# Patient Record
Sex: FEMALE | Race: BLACK OR AFRICAN AMERICAN | Employment: FULL TIME | URBAN - METROPOLITAN AREA
[De-identification: names, ages, dates, MRNs, and addresses within clinical notes are randomized per-mention and may not be internally consistent; named-entity substitution may affect disease eponyms.]

---

## 2017-09-21 ENCOUNTER — HOSPITAL ENCOUNTER (OUTPATIENT)
Dept: MAMMOGRAPHY | Age: 42
Discharge: HOME | End: 2017-09-21
Attending: OBSTETRICS & GYNECOLOGY
Payer: OTHER GOVERNMENT

## 2017-09-21 DIAGNOSIS — Z12.31 VISIT FOR SCREENING MAMMOGRAM: ICD-10-CM

## 2017-09-21 PROCEDURE — 77067 SCR MAMMO BI INCL CAD: CPT

## 2018-01-25 ENCOUNTER — OFFICE VISIT (OUTPATIENT)
Dept: FAMILY MEDICINE CLINIC | Age: 43
End: 2018-01-25

## 2018-01-25 VITALS
RESPIRATION RATE: 16 BRPM | HEART RATE: 83 BPM | OXYGEN SATURATION: 99 % | SYSTOLIC BLOOD PRESSURE: 100 MMHG | BODY MASS INDEX: 29.66 KG/M2 | WEIGHT: 178 LBS | HEIGHT: 65 IN | TEMPERATURE: 98 F | DIASTOLIC BLOOD PRESSURE: 66 MMHG

## 2018-01-25 DIAGNOSIS — Z11.3 SCREEN FOR STD (SEXUALLY TRANSMITTED DISEASE): ICD-10-CM

## 2018-01-25 DIAGNOSIS — Z01.84 IMMUNITY STATUS TESTING: ICD-10-CM

## 2018-01-25 DIAGNOSIS — J06.9 UPPER RESPIRATORY TRACT INFECTION, UNSPECIFIED TYPE: ICD-10-CM

## 2018-01-25 DIAGNOSIS — Z01.419 WELL WOMAN EXAM WITH ROUTINE GYNECOLOGICAL EXAM: Primary | ICD-10-CM

## 2018-01-25 DIAGNOSIS — Z87.42 H/O INFERTILITY: ICD-10-CM

## 2018-01-25 NOTE — PATIENT INSTRUCTIONS

## 2018-01-25 NOTE — PROGRESS NOTES
1. Have you been to the ER, urgent care clinic since your last visit? Hospitalized since your last visit? No    2. Have you seen or consulted any other health care providers outside of the [address] [place] [address] since your last visit? Include any pap smears or colon screening.  No     Last Pap Smear:

## 2018-01-25 NOTE — MR AVS SNAPSHOT
[address] [place] [address] 
[phone] Patient: Taran Tyler MRN: [id number] :1975 Visit Information Date & Time Provider Department Dept. Phone Encounter #  
 2018  3:30 PM Misty Kasper, [address] [place] [address] [id number] Follow-up Instructions Return in about 1 year (around 2019) for physical.  Labs due at your earliest convenience . Upcoming Health Maintenance Date Due  
 PAP AKA CERVICAL CYTOLOGY 2016 Influenza Age 5 to Adult 2017 BREAST CANCER SCRN MAMMOGRAM 2019 DTaP/Tdap/Td series (6 - Td) 10/27/2024 Allergies as of 2018  Review Complete On: 2018 By: Bethany Chau Severity Noted Reaction Type Reactions Milk  10/24/2014    Diarrhea Current Immunizations  Reviewed on 2018 Name Date DTaP 1979, 1975, 3/6/1975, 1975 Influenza Vaccine (Quad) PF 2016 MMR 1979, 1977 Poliovirus vaccine 1979, 1975, 3/6/1975, 1975 TB Skin Test (PPD) 1979, 1977 TB Skin Test (PPD) Intradermal 2016 Tdap 10/27/2014 11:12 AM  
  
 Reviewed by Bethany Chau on 2018 at  3:45 PM  
You Were Diagnosed With   
  
 Codes Comments H/O infertility    -  Primary ICD-10-CM: Z87.42 
ICD-9-CM: V13.29 Screen for STD (sexually transmitted disease)     ICD-10-CM: Z11.3 ICD-9-CM: V74.5 Well woman exam with routine gynecological exam     ICD-10-CM: L82.949 ICD-9-CM: V72.31 Immunity status testing     ICD-10-CM: Z01.84 ICD-9-CM: V72.61 Vitals BP Pulse Temp Resp Height(growth percentile) Weight(growth percentile) 100/66 (BP 1 Location: Right arm, BP Patient Position: Sitting) 83 98 °F (36.7 °C) (Oral) 16 5' 5\" (1.651 m) 178 lb (80.7 kg) SpO2 BMI OB Status Smoking Status 99% 29.62 kg/m2 Having regular periods Never Smoker Vitals History BMI and BSA Data Body Mass Index Body Surface Area  
 29.62 kg/m 2 1.92 m 2 Preferred Pharmacy Pharmacy Name Phone [facility], [address] [phone] Your Updated Medication List  
  
   
This list is accurate as of: 1/25/18  4:37 PM.  Always use your most recent med list.  
  
  
  
  
 VALTREX 500 mg tablet Generic drug:  valACYclovir Take 500 mg by mouth two (2) times a day. Indications: PREVENTION OF HERPES ZOSTER IN IMMUNOCOMPROMISED PATIENT We Performed the Following REFERRAL TO OBSTETRICS AND GYNECOLOGY [REF51 Custom] Follow-up Instructions Return in about 1 year (around 1/25/2019) for physical.  Labs due at your earliest convenience . To-Do List   
 01/25/2018 Lab:  CHLAMYDIA/GC PCR   
  
 01/25/2018 Lab:  CMV AB, IGG   
  
 01/25/2018 Lab:  CMV AB, IGM   
  
 01/25/2018 Lab:  CYSTIC FIBROSIS MUTATIONS   
  
 01/25/2018 Lab:  HEMOGLOBIN FRACTIONATION   
  
 01/25/2018 Lab:  HEP B SURFACE AG   
  
 01/25/2018 Lab:  HEPATITIS C AB   
  
 01/25/2018 Lab:  HIV 1/2 AG/AB, 4TH GENERATION,W RFLX CONFIRM   
  
 01/25/2018 Lab:  HTLV I-II   
  
 01/25/2018 Lab:  RPR   
  
 01/25/2018 Lab:  RUBELLA AB, IGG   
  
 01/25/2018 Lab:  TSH 3RD GENERATION   
  
 01/25/2018 Blood Bank:  TYPE & SCREEN   
  
 01/25/2018 Lab:  VZV AB, IGG Referral Information Referral ID Referred By Referred To  
  
 [id number] Yon [address] [facility], [place]   
   [address] [place] [address], [address] [place] [address],[facility] 300 Phone: [phone] Fax: [phone] Visits Status Start Date End Date 1 New Request 1/25/18 1/25/19 If your referral has a status of pending review or denied, additional information will be sent to support the outcome of this decision. Patient Instructions Well Visit, Ages 25 to 48: Care Instructions Your Care Instructions Physical exams can help you stay healthy. Your doctor has checked your overall health and may have suggested ways to take good care of yourself. He or she also may have recommended tests. At home, you can help prevent illness with healthy eating, regular exercise, and other steps. Follow-up care is a key part of your treatment and safety. Be sure to make and go to all appointments, and call your doctor if you are having problems. It's also a good idea to know your test results and keep a list of the medicines you take. How can you care for yourself at home? · Reach and stay at a healthy weight. This will lower your risk for many problems, such as obesity, diabetes, heart disease, and high blood pressure. · Get at least 30 minutes of physical activity on most days of the week. Walking is a good choice. You also may want to do other activities, such as running, swimming, cycling, or playing tennis or team sports. Discuss any changes in your exercise program with your doctor. · Do not smoke or allow others to smoke around you. If you need help quitting, talk to your doctor about stop-smoking programs and medicines. These can increase your chances of quitting for good. · Talk to your doctor about whether you have any risk factors for sexually transmitted infections (STIs). Having one sex partner (who does not have STIs and does not have sex with anyone else) is a good way to avoid these infections. · Use birth control if you do not want to have children at this time. Talk with your doctor about the choices available and what might be best for you. · Protect your skin from too much sun. When you're outdoors from 10 a.m. to 4 p.m., stay in the shade or cover up with clothing and a hat with a wide brim. Wear sunglasses that block UV rays. Even when it's cloudy, put broad-spectrum sunscreen (SPF 30 or higher) on any exposed skin. · See a dentist one or two times a year for checkups and to have your teeth cleaned. · Wear a seat belt in the car. · Drink alcohol in moderation, if at all. That means no more than 2 drinks a day for men and 1 drink a day for women. Follow your doctor's advice about when to have certain tests. These tests can spot problems early. For everyone · Cholesterol. Have the fat (cholesterol) in your blood tested after age 21. Your doctor will tell you how often to have this done based on your age, family history, or other things that can increase your risk for heart disease. · Blood pressure. Have your blood pressure checked during a routine doctor visit. Your doctor will tell you how often to check your blood pressure based on your age, your blood pressure results, and other factors. · Vision. Talk with your doctor about how often to have a glaucoma test. 
· Diabetes. Ask your doctor whether you should have tests for diabetes. · Colon cancer. Have a test for colon cancer at age 48. You may have one of several tests. If you are younger than 48, you may need a test earlier if you have any risk factors. Risk factors include whether you already had a precancerous polyp removed from your colon or whether your parent, brother, sister, or child has had colon cancer. For women · Breast exam and mammogram. Talk to your doctor about when you should have a clinical breast exam and a mammogram. Medical experts differ on whether and how often women under 50 should have these tests. Your doctor can help you decide what is right for you. · Pap test and pelvic exam. Begin Pap tests at age 24. A Pap test is the best way to find cervical cancer. The test often is part of a pelvic exam. Ask how often to have this test. 
· Tests for sexually transmitted infections (STIs). Ask whether you should have tests for STIs. You may be at risk if you have sex with more than one person, especially if your partners do not wear condoms. For men · Tests for sexually transmitted infections (STIs). Ask whether you should have tests for STIs. You may be at risk if you have sex with more than one person, especially if you do not wear a condom. · Testicular cancer exam. Ask your doctor whether you should check your testicles regularly. · Prostate exam. Talk to your doctor about whether you should have a blood test (called a PSA test) for prostate cancer. Experts differ on whether and when men should have this test. Some experts suggest it if you are older than 39 and are -American or have a father or brother who got prostate cancer when he was younger than 72. When should you call for help? Watch closely for changes in your health, and be sure to contact your doctor if you have any problems or symptoms that concern you. Where can you learn more? Go to http://radha-roney.info/. Enter P072 in the search box to learn more about \"Well Visit, Ages 25 to 48: Care Instructions. \" Current as of: May 12, 2017 Content Version: 11.4 © 6527-0258 [employer]. Care instructions adapted under license by [employer] (which disclaims liability or warranty for this information). If you have questions about a medical condition or this instruction, always ask your healthcare professional. [address] any warranty or liability for your use of this information. Introducing [facility] & HEALTH SERVICES! Dear Ladonna Stallings: Thank you for requesting a [employer] account. Our records indicate that you already have an active [employer] account. You can access your account anytime at [email or web address] Did you know that you can access your hospital and ER discharge instructions at any time in [employer]? You can also review all of your test results from your hospital stay or ER visit. Additional Information If you have questions, please visit the Frequently Asked Questions section of the [employer] website at [email or web address] Remember, [employer] is NOT to be used for urgent needs. For medical emergencies, dial 911. Now available from your iPhone and Android! Please provide this summary of care documentation to your next provider. Your primary care clinician is listed as Kenisha Wiley. If you have any questions after today's visit, please call [phone].

## 2018-01-26 NOTE — PROGRESS NOTES
Subjective:   36 y.o. female for Well Woman Check. Her gyne and breast care is done elsewhere by her Ob-Gyn physician. Current Outpatient Prescriptions   Medication Sig Dispense Refill    valacyclovir (VALTREX) 500 mg tablet Take 500 mg by mouth two (2) times a day. Indications: PREVENTION OF HERPES ZOSTER IN IMMUNOCOMPROMISED PATIENT       Allergies   Allergen Reactions    Milk Diarrhea     Past Medical History:   Diagnosis Date    Allergic rhinitis     Hallux valgus     Patellofemoral syndrome     Vitamin D deficiency      Past Surgical History:   Procedure Laterality Date    HX OTHER SURGICAL  3/2013    hysteroscopy - polyp removal     Family History   Problem Relation Age of Onset    Asthma Mother     Hypertension Mother     Cancer Father      prostate    Ovarian Cancer Maternal Grandmother     Dementia Paternal Grandmother     Elevated Lipids Brother      Social History   Substance Use Topics    Smoking status: Never Smoker    Smokeless tobacco: Never Used    Alcohol use No      ROS: Feeling generally well. No TIA's or unusual headaches, no dysphagia. No prolonged cough. No dyspnea or chest pain on exertion. No abdominal pain, change in bowel habits, black or bloody stools. No urinary tract symptoms. No new or unusual musculoskeletal symptoms. Specific concerns today:   She had a cold that she feels she is getting over. No complaints. She wanted to address her infertility work-up instead. 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 Objective: The patient appears well, alert, oriented x 3, in no distress. Visit Vitals    /66 (BP 1 Location: Right arm, BP Patient Position: Sitting)    Pulse 83    Temp 98 °F (36.7 °C) (Oral)    Resp 16    Ht 5' 5\" (1.651 m)    Wt 178 lb (80.7 kg)    SpO2 99%    BMI 29.62 kg/m2     ENT normal.  Neck supple. No adenopathy or thyromegaly. GEOVANNY.  Lungs are clear, good air entry, no wheezes, rhonchi or rales. S1 and S2 normal, no murmurs, regular rate and rhythm. Abdomen soft without tenderness, guarding, mass or organomegaly. Extremities show no edema, normal peripheral pulses. Neurological is normal, no focal findings. Psych: normal affect. Mood good. Oriented x 3. Judgement and insight intact. Breast and Pelvic exams are deferred. Assessment/Plan:       ICD-10-CM ICD-9-CM    1. Well woman exam with routine gynecological exam Z01.419 V72.31 REFERRAL TO OBSTETRICS AND GYNECOLOGY   2. H/O infertility Z87.42 V13.29 TSH 3RD GENERATION      TYPE & SCREEN      CYSTIC FIBROSIS MUTATIONS      HEMOGLOBIN FRACTIONATION   3. Screen for STD (sexually transmitted disease) Z11.3 V74.5 HEP B SURFACE AG      HEPATITIS C AB      HIV 1/2 AG/AB, 4TH GENERATION,W RFLX CONFIRM      RPR      CHLAMYDIA/GC PCR   4. Immunity status testing Z01.84 V72.61 HTLV I-II      CMV AB, IGG      CMV AB, IGM      RUBELLA AB, IGG      VZV AB, IGG   5. Body mass index (BMI) of 25.0 to 29.9 E66.3 278.02    6. Upper respiratory tract infection, unspecified type J06.9 465.9      Age and sex specific counseling. Lab panel ordered per her request.  I have advised that some of these labs were done before but she says she would rather have the entire panel done again because the infertility clinic is particular. Diet and exercise. Monitor UTI to resolution. Refer to OB/GYN for well woman exam as well. All chart history elements were reviewed by me at the time of the visit even though marked at time of note closure. Patient understands our medical plan. Patient has provided input and agrees with goals. Alternatives have been explained and offered. All questions answered. The patient is to call if condition worsens or fails to improve. Follow-up Disposition:  Return in about 1 year (around 1/25/2019) for physical.  Labs due at your earliest convenience .

## 2018-02-05 ENCOUNTER — HOSPITAL ENCOUNTER (OUTPATIENT)
Dept: LAB | Age: 43
Discharge: HOME | End: 2018-02-05
Payer: OTHER GOVERNMENT

## 2018-02-05 DIAGNOSIS — Z01.84 IMMUNITY STATUS TESTING: ICD-10-CM

## 2018-02-05 DIAGNOSIS — Z87.42 H/O INFERTILITY: ICD-10-CM

## 2018-02-05 DIAGNOSIS — Z11.3 SCREEN FOR STD (SEXUALLY TRANSMITTED DISEASE): ICD-10-CM

## 2018-02-05 LAB
HBV SURFACE AG SER QL: <0.1 INDEX
HBV SURFACE AG SER QL: NEGATIVE
HCV AB SER IA-ACNC: 0.04 INDEX
HCV AB SERPL QL IA: NEGATIVE
HCV COMMENT,HCGAC: NORMAL
HIV 1+2 AB+HIV1 P24 AG SERPL QL IA: NONREACTIVE
HIV12 RESULT COMMENT, HHIVC: NORMAL
RPR SER QL: NONREACTIVE
TSH SERPL DL<=0.05 MIU/L-ACNC: 0.73 UIU/ML (ref 0.36–3.74)

## 2018-02-05 PROCEDURE — 86687 HTLV-I ANTIBODY: CPT | Performed by: FAMILY MEDICINE

## 2018-02-05 PROCEDURE — 83021 HEMOGLOBIN CHROMOTOGRAPHY: CPT | Performed by: FAMILY MEDICINE

## 2018-02-05 PROCEDURE — 86592 SYPHILIS TEST NON-TREP QUAL: CPT | Performed by: FAMILY MEDICINE

## 2018-02-05 PROCEDURE — 73060999999 HC MISC LAB CHARGE

## 2018-02-05 PROCEDURE — 86645 CMV ANTIBODY IGM: CPT | Performed by: FAMILY MEDICINE

## 2018-02-05 PROCEDURE — 87340 HEPATITIS B SURFACE AG IA: CPT | Performed by: FAMILY MEDICINE

## 2018-02-05 PROCEDURE — 87389 HIV-1 AG W/HIV-1&-2 AB AG IA: CPT | Performed by: FAMILY MEDICINE

## 2018-02-05 PROCEDURE — 86787 VARICELLA-ZOSTER ANTIBODY: CPT | Performed by: FAMILY MEDICINE

## 2018-02-05 PROCEDURE — 86644 CMV ANTIBODY: CPT | Performed by: FAMILY MEDICINE

## 2018-02-05 PROCEDURE — 84443 ASSAY THYROID STIM HORMONE: CPT | Performed by: FAMILY MEDICINE

## 2018-02-05 PROCEDURE — 36415 COLL VENOUS BLD VENIPUNCTURE: CPT | Performed by: FAMILY MEDICINE

## 2018-02-05 PROCEDURE — 86803 HEPATITIS C AB TEST: CPT | Performed by: FAMILY MEDICINE

## 2018-02-05 PROCEDURE — 81401 MOPATH PROCEDURE LEVEL 2: CPT

## 2018-02-05 PROCEDURE — 81220 CFTR GENE COM VARIANTS: CPT | Performed by: FAMILY MEDICINE

## 2018-02-05 PROCEDURE — 87491 CHLMYD TRACH DNA AMP PROBE: CPT | Performed by: FAMILY MEDICINE

## 2018-02-05 PROCEDURE — 86762 RUBELLA ANTIBODY: CPT | Performed by: FAMILY MEDICINE

## 2018-02-06 LAB
CMV IGG SERPL IA-ACNC: <0.6 U/ML (ref 0–0.59)
CMV IGM SERPL IA-ACNC: <30 AU/ML (ref 0–29.9)
DEPRECATED HGB OTHER BLD-IMP: 0 %
HGB A MFR BLD: 98 % (ref 96.4–98.8)
HGB A2 MFR BLD COLUMN CHROM: 2 % (ref 1.8–3.2)
HGB C MFR BLD: 0 %
HGB F MFR BLD: 0 % (ref 0–2)
HGB FRACT BLD-IMP: NORMAL
HGB S BLD QL SOLY: NEGATIVE
HGB S MFR BLD: 0 %
RUBV IGG SER-IMP: NORMAL
VZV IGG SER IA-ACNC: 1747 INDEX

## 2018-02-07 LAB
C TRACH RRNA SPEC QL NAA+PROBE: NEGATIVE
N GONORRHOEA RRNA SPEC QL NAA+PROBE: NEGATIVE
SPECIMEN SOURCE: NORMAL

## 2018-02-08 LAB
HTLV I AB SER QL IB: NEGATIVE
HTLV II AB SER QL: NEGATIVE

## 2018-02-09 LAB
CFTR MUT ANL BLD/T: NORMAL
COMMENT: 480556: NORMAL

## 2018-02-16 LAB
Lab: NORMAL
REFERENCE LAB,REFLB: NORMAL
TEST DESCRIPTION:,ATST: NORMAL

## 2018-05-24 ENCOUNTER — HOSPITAL ENCOUNTER (OUTPATIENT)
Dept: GENERAL RADIOLOGY | Age: 43
Discharge: HOME | End: 2018-05-24
Payer: OTHER GOVERNMENT

## 2018-05-24 ENCOUNTER — OFFICE VISIT (OUTPATIENT)
Dept: FAMILY MEDICINE CLINIC | Age: 43
End: 2018-05-24

## 2018-05-24 VITALS
SYSTOLIC BLOOD PRESSURE: 102 MMHG | RESPIRATION RATE: 14 BRPM | HEIGHT: 65 IN | OXYGEN SATURATION: 97 % | TEMPERATURE: 97.7 F | WEIGHT: 182 LBS | BODY MASS INDEX: 30.32 KG/M2 | DIASTOLIC BLOOD PRESSURE: 68 MMHG | HEART RATE: 77 BPM

## 2018-05-24 DIAGNOSIS — M25.561 ACUTE PAIN OF RIGHT KNEE: Primary | ICD-10-CM

## 2018-05-24 DIAGNOSIS — M25.561 ACUTE PAIN OF RIGHT KNEE: ICD-10-CM

## 2018-05-24 PROCEDURE — 73564 X-RAY EXAM KNEE 4 OR MORE: CPT

## 2018-05-24 RX ORDER — ESTRADIOL 1 MG/1
2 TABLET ORAL 3 TIMES DAILY
COMMUNITY
End: 2018-09-12

## 2018-05-24 NOTE — PATIENT INSTRUCTIONS
Patellofemoral Pain Syndrome: Care Instructions  Your Care Instructions  Patellofemoral pain syndrome is pain in the front of the knee. It is caused by overuse, weak thigh muscles (quadriceps), or a problem with the way the kneecap moves. Extra weight may also cause this syndrome. The patella is the kneecap, and the femur is the thighbone. Some people may have pain in the front of the knee from a condition called chondromalacia. In this problem, the underside of the knee cartilage wears down and frays. Cartilage is a rubbery tissue that cushions joints. In some cases, the kneecap does not move, or track, in a normal way. You may have knee pain when you run, walk down hills or steps, or do another activity. Sitting for a long time also can cause knee pain. Your knee pain may get better with medicines for pain and swelling and exercises to make your quadriceps stronger. Losing weight, if you need to, may also help with pain. Follow-up care is a key part of your treatment and safety. Be sure to make and go to all appointments, and call your doctor if you are having problems. It's also a good idea to know your test results and keep a list of the medicines you take. How can you care for yourself at home? · Ask your doctor if you can take an over-the-counter pain medicine, such as acetaminophen (Tylenol), ibuprofen (Advil, Motrin), or naproxen (Aleve). Be safe with medicines. Read and follow all instructions on the label. · Rest and protect your knee. Take a break from activities that cause pain, such as long periods of sitting or kneeling. · Put ice or a cold pack on your knee for 10 to 20 minutes after activity. Put a thin cloth between the ice and your skin. · If your doctor recommends an elastic bandage, sleeve, or other type of support for your knee, wear it as directed. · If your knee is not swollen, you can put moist heat, a heating pad, or a warm cloth on your knee.  After several days of rest, you can begin gentle exercise of your knee. · Reach and stay at a healthy weight. Being overweight puts stress on your knees. · Wear athletic shoes that offer good support, especially if you run. · Use shoe inserts, or orthotics, if they help reduce your knee pain. Many drugstores and shoe stores sell them. · See a physical therapist to learn more exercises and stretches to make your legs stronger. When should you call for help? Watch closely for changes in your health, and be sure to contact your doctor if:  ? · Your knee pain does not get better or gets worse. Where can you learn more? Go to http://radha-roney.info/. Enter F136 in the search box to learn more about \"Patellofemoral Pain Syndrome: Care Instructions. \"  Current as of: March 21, 2017  Content Version: 11.4  © 4588-1366 Healthwise, Incorporated. Care instructions adapted under license by [employer] (which disclaims liability or warranty for this information). If you have questions about a medical condition or this instruction, always ask your healthcare professional. [address] any warranty or liability for your use of this information.

## 2018-05-24 NOTE — PROGRESS NOTES
Chief Complaint   Patient presents with    Knee Pain     right x 1 week; denies injury; worsening of pain; taking Motrin PRN; + swelling     . 1. Have you been to the ER, urgent care clinic since your last visit? Hospitalized since your last visit? No    2. Have you seen or consulted any other health care providers outside of the [facility] since your last visit? Include any pap smears or colon screening.  Yes Where: IVF treatment

## 2018-05-24 NOTE — MR AVS SNAPSHOT
[address] [facility] [address] 
[phone] Patient: León Olivo MRN: [id number] :1975 Visit Information Date & Time Provider Department Dept. Phone Encounter #  
 2018  4:00 PM Hugo Tapia, [address] [id number] Follow-up Instructions Return as needed. Your Appointments 2019  8:00 AM  
FOLLOW UP EXAM with Hugo Tapia MD  
[facility] ([address]) Appt Note: cpe  
 [address] [facility] [address]  
[address] [facility] [address] [facility] [address] Upcoming Health Maintenance Date Due Influenza Age 5 to Adult 2018 BREAST CANCER SCRN MAMMOGRAM 2019 PAP AKA CERVICAL CYTOLOGY 2020 DTaP/Tdap/Td series (6 - Td) 10/27/2024 Allergies as of 2018  Review Complete On: 2018 By: Hugo Tapia MD  
  
 Severity Noted Reaction Type Reactions Milk  10/24/2014    Diarrhea Current Immunizations  Reviewed on 2018 Name Date DTaP 1979, 1975, 3/6/1975, 1975 Influenza Vaccine (Quad) PF 2016 MMR 1979, 1977 Poliovirus vaccine 1979, 1975, 3/6/1975, 1975 TB Skin Test (PPD) 1979, 1977 TB Skin Test (PPD) Intradermal 2016 Tdap 10/27/2014 11:12 AM  
  
 Not reviewed this visit You Were Diagnosed With   
  
 Codes Comments Acute pain of right knee    -  Primary ICD-10-CM: M25.561 ICD-9-CM: 719.46 Vitals BP Pulse Temp Resp Height(growth percentile) Weight(growth percentile) 102/68 (BP 1 Location: Left arm, BP Patient Position: Sitting) 77 97.7 °F (36.5 °C) (Oral) 14 5' 5\" (1.651 m) 182 lb (82.6 kg) LMP SpO2 BMI OB Status Smoking Status 2018 97% 30.29 kg/m2 Having regular periods Never Smoker Vitals History BMI and BSA Data Body Mass Index Body Surface Area  
 30.29 kg/m 2 1.95 m 2 Preferred Pharmacy Pharmacy Name Phone JAX[facility] RYAN [facility] [address], [address] [phone] Your Updated Medication List  
  
   
This list is accurate as of 5/24/18  4:36 PM.  Always use your most recent med list.  
  
  
  
  
 ESTRACE 1 mg tablet Generic drug:  estradiol Take 2 mg by mouth three (3) times daily. LUPRON SC  
20 mL by SubCUTAneous route daily. VALTREX 500 mg tablet Generic drug:  valACYclovir Take 500 mg by mouth two (2) times a day. Indications: PREVENTION OF HERPES ZOSTER IN IMMUNOCOMPROMISED PATIENT Follow-up Instructions Return as needed. To-Do List   
 05/24/2018 Imaging:  XR KNEE RT 3 V Patient Instructions Patellofemoral Pain Syndrome: Care Instructions Your Care Instructions Patellofemoral pain syndrome is pain in the front of the knee. It is caused by overuse, weak thigh muscles (quadriceps), or a problem with the way the kneecap moves. Extra weight may also cause this syndrome. The patella is the kneecap, and the femur is the thighbone. Some people may have pain in the front of the knee from a condition called chondromalacia. In this problem, the underside of the knee cartilage wears down and frays. Cartilage is a rubbery tissue that cushions joints. In some cases, the kneecap does not move, or track, in a normal way. You may have knee pain when you run, walk down hills or steps, or do another activity. Sitting for a long time also can cause knee pain. Your knee pain may get better with medicines for pain and swelling and exercises to make your quadriceps stronger. Losing weight, if you need to, may also help with pain. Follow-up care is a key part of your treatment and safety.  Be sure to make and go to all appointments, and call your doctor if you are having problems. It's also a good idea to know your test results and keep a list of the medicines you take. How can you care for yourself at home? · Ask your doctor if you can take an over-the-counter pain medicine, such as acetaminophen (Tylenol), ibuprofen (Advil, Motrin), or naproxen (Aleve). Be safe with medicines. Read and follow all instructions on the label. · Rest and protect your knee. Take a break from activities that cause pain, such as long periods of sitting or kneeling. · Put ice or a cold pack on your knee for 10 to 20 minutes after activity. Put a thin cloth between the ice and your skin. · If your doctor recommends an elastic bandage, sleeve, or other type of support for your knee, wear it as directed. · If your knee is not swollen, you can put moist heat, a heating pad, or a warm cloth on your knee. After several days of rest, you can begin gentle exercise of your knee. · Reach and stay at a healthy weight. Being overweight puts stress on your knees. · Wear athletic shoes that offer good support, especially if you run. · Use shoe inserts, or orthotics, if they help reduce your knee pain. Many drugstores and shoe stores sell them. · See a physical therapist to learn more exercises and stretches to make your legs stronger. When should you call for help? Watch closely for changes in your health, and be sure to contact your doctor if: 
? · Your knee pain does not get better or gets worse. Where can you learn more? Go to http://radha-roney.info/. Enter F280 in the search box to learn more about \"Patellofemoral Pain Syndrome: Care Instructions. \" Current as of: March 21, 2017 Content Version: 11.4 © 6755-0741 [employer]. Care instructions adapted under license by [employer] (which disclaims liability or warranty for this information).  If you have questions about a medical condition or this instruction, always ask your healthcare professional. [address] any warranty or liability for your use of this information. Introducing [facility] & HEALTH SERVICES! Dear Arleth Warren: Thank you for requesting a [employer] account. Our records indicate that you already have an active [employer] account. You can access your account anytime at [email or web address] Did you know that you can access your hospital and ER discharge instructions at any time in [employer]? You can also review all of your test results from your hospital stay or ER visit. Additional Information If you have questions, please visit the Frequently Asked Questions section of the [employer] website at [email or web address] Remember, [employer] is NOT to be used for urgent needs. For medical emergencies, dial 911. Now available from your iPhone and Android! Please provide this summary of care documentation to your next provider. Your primary care clinician is listed as Marea Art. If you have any questions after today's visit, please call [phone].

## 2018-05-25 NOTE — PROGRESS NOTES
SUBJECTIVE  Chief Complaint   Patient presents with    Knee Pain     right x 1 week; denies injury; worsening of pain; taking Motrin PRN; + swelling     The patient presents complaining of a 1 week history of right knee pain. Location: diffuse  Quality: sore  Injury: denies but had been doing more exercise  - long walking, strength training  Radiation: denies  Swelling: feels swollen  Instability: denies   Locking: denies   Aggravating factors: walking  Relieving factors: Motrin   Has tried: Motrin  X-rays: not yet  PT: not yet    OBJECTIVE    Blood pressure 102/68, pulse 77, temperature 97.7 °F (36.5 °C), temperature source Oral, resp. rate 14, height 5' 5\" (1.651 m), weight 182 lb (82.6 kg), last menstrual period 05/14/2018, SpO2 97 %. General:  alert, cooperative, well appearing, in no apparent distress. Right Knee: Inspection of the knee demonstrates there is no obvious deformity. There is a 1_ effusion. There is no evidence of dislocation. There is normal flexion and extension of the bilateral knee. Full flexion caused her to jump initially in pain. Assessment of ligamentous stability demonstrates the ACL/PCL/LCL/MCL are all intact. There is no pain with varus or valgus strain. Palpation demonstrates there is no medial or lateral joint line tenderness. There is no tenderness of the pes anserine bursa. There is no tenderness along the tibial tuberosity. The patellar tendon is slightly sensitive. There is no tenderness under the medial facet of the patella. There is mild apprehension with patellar subluxation. There is no pain with rocking or grinding. The bilateral knee demonstrates normal muscle tone with 5/5 strength with flexion extension. Skin:  There is no redness or warmth. No local rashes. There is no bruising. Psych: normal affect. Mood good. Oriented x 3. Judgement and insight intact. ASSESSMENT / PLAN    ICD-10-CM ICD-9-CM    1.  Acute pain of right knee M25.561 719.46 CANCELED: XR KNEE RT 3 V     X-rays ordered. Ice twice daily. NSAIDs as long as okay with fertility treatments. Advised on activity modification. HEP provided for PFS. All chart history elements were reviewed by me at the time of the visit even though marked at time of note closure. Patient understands our medical plan. Patient has provided input and agrees with goals. Alternatives have been explained and offered. All questions answered. The patient is to call if condition worsens or fails to improve. Follow-up Disposition:  Return as needed.

## 2018-06-18 ENCOUNTER — OFFICE VISIT (OUTPATIENT)
Dept: ORTHOPEDIC SURGERY | Facility: CLINIC | Age: 43
End: 2018-06-18

## 2018-06-18 VITALS
RESPIRATION RATE: 18 BRPM | WEIGHT: 182.4 LBS | BODY MASS INDEX: 30.39 KG/M2 | SYSTOLIC BLOOD PRESSURE: 117 MMHG | OXYGEN SATURATION: 99 % | TEMPERATURE: 98.8 F | HEART RATE: 85 BPM | HEIGHT: 65 IN | DIASTOLIC BLOOD PRESSURE: 65 MMHG

## 2018-06-18 DIAGNOSIS — M65.9 SYNOVITIS OF KNEE: ICD-10-CM

## 2018-06-18 DIAGNOSIS — M71.21 POPLITEAL CYST, RIGHT: ICD-10-CM

## 2018-06-18 DIAGNOSIS — M17.11 PRIMARY OSTEOARTHRITIS OF RIGHT KNEE: Primary | ICD-10-CM

## 2018-06-18 DIAGNOSIS — M25.461 KNEE EFFUSION, RIGHT: ICD-10-CM

## 2018-06-18 RX ORDER — BUPIVACAINE HYDROCHLORIDE 2.5 MG/ML
4 INJECTION, SOLUTION EPIDURAL; INFILTRATION; INTRACAUDAL ONCE
Qty: 4 ML | Refills: 0
Start: 2018-06-18 | End: 2018-06-18

## 2018-06-18 RX ORDER — BUPIVACAINE HYDROCHLORIDE 2.5 MG/ML
10 INJECTION, SOLUTION EPIDURAL; INFILTRATION; INTRACAUDAL ONCE
Qty: 10 ML | Refills: 0
Start: 2018-06-18 | End: 2018-06-18

## 2018-06-18 RX ORDER — NORGESTREL AND ETHINYL ESTRADIOL 0.3-0.03MG
KIT ORAL
COMMUNITY
Start: 2018-05-06 | End: 2018-10-17 | Stop reason: SDUPTHER

## 2018-06-18 RX ORDER — BETAMETHASONE SODIUM PHOSPHATE AND BETAMETHASONE ACETATE 3; 3 MG/ML; MG/ML
6 INJECTION, SUSPENSION INTRA-ARTICULAR; INTRALESIONAL; INTRAMUSCULAR; SOFT TISSUE ONCE
Qty: 0.5 ML | Refills: 0
Start: 2018-06-18 | End: 2018-06-18

## 2018-06-18 NOTE — PATIENT INSTRUCTIONS
Knee Arthritis: Exercises  Your Care Instructions  Here are some examples of exercises for knee arthritis. Start each exercise slowly. Ease off the exercise if you start to have pain. Your doctor or physical therapist will tell you when you can start these exercises and which ones will work best for you. How to do the exercises  Knee flexion with heel slide    1. Lie on your back with your knees bent. 2. Slide your heel back by bending your affected knee as far as you can. Then hook your other foot around your ankle to help pull your heel even farther back. 3. Hold for about 6 seconds, then rest for up to 10 seconds. 4. Repeat 8 to 12 times. 5. Switch legs and repeat steps 1 through 4, even if only one knee is sore. Quad sets    1. Sit with your affected leg straight and supported on the floor or a firm bed. Place a small, rolled-up towel under your knee. Your other leg should be bent, with that foot flat on the floor. 2. Tighten the thigh muscles of your affected leg by pressing the back of your knee down into the towel. 3. Hold for about 6 seconds, then rest for up to 10 seconds. 4. Repeat 8 to 12 times. 5. Switch legs and repeat steps 1 through 4, even if only one knee is sore. Straight-leg raises to the front    1. Lie on your back with your good knee bent so that your foot rests flat on the floor. Your affected leg should be straight. Make sure that your low back has a normal curve. You should be able to slip your hand in between the floor and the small of your back, with your palm touching the floor and your back touching the back of your hand. 2. Tighten the thigh muscles in your affected leg by pressing the back of your knee flat down to the floor. Hold your knee straight. 3. Keeping the thigh muscles tight and your leg straight, lift your affected leg up so that your heel is about 12 inches off the floor. Hold for about 6 seconds, then lower slowly.   4. Relax for up to 10 seconds between repetitions. 5. Repeat 8 to 12 times. 6. Switch legs and repeat steps 1 through 5, even if only one knee is sore. Active knee flexion    1. Lie on your stomach with your knees straight. If your kneecap is uncomfortable, roll up a washcloth and put it under your leg just above your kneecap. 2. Lift the foot of your affected leg by bending the knee so that you bring the foot up toward your buttock. If this motion hurts, try it without bending your knee quite as far. This may help you avoid any painful motion. 3. Slowly move your leg up and down. 4. Repeat 8 to 12 times. 5. Switch legs and repeat steps 1 through 4, even if only one knee is sore. Quadriceps stretch (facedown)    1. Lie flat on your stomach, and rest your face on the floor. 2. Wrap a towel or belt strap around the lower part of your affected leg. Then use the towel or belt strap to slowly pull your heel toward your buttock until you feel a stretch. 3. Hold for about 15 to 30 seconds, then relax your leg against the towel or belt strap. 4. Repeat 2 to 4 times. 5. Switch legs and repeat steps 1 through 4, even if only one knee is sore. Stationary exercise bike    1. If you do not have a stationary exercise bike at home, you can find one to ride at your local health club or community center. 2. Adjust the height of the bike seat so that your knee is slightly bent when your leg is extended downward. If your knee hurts when the pedal reaches the top, you can raise the seat so that your knee does not bend as much. 3. Start slowly. At first, try to do 5 to 10 minutes of cycling with little to no resistance. Then increase your time and the resistance bit by bit until you can do 20 to 30 minutes without pain. 4. If you start to have pain, rest your knee until your pain gets back to the level that is normal for you. Or cycle for less time or with less effort. Follow-up care is a key part of your treatment and safety.  Be sure to make and go to all appointments, and call your doctor if you are having problems. It's also a good idea to know your test results and keep a list of the medicines you take. Where can you learn more? Go to http://radha-roney.info/. Enter C159 in the search box to learn more about \"Knee Arthritis: Exercises. \"  Current as of: March 21, 2017  Content Version: 11.4  © 2006-2017 [employer]. Care instructions adapted under license by [employer] (which disclaims liability or warranty for this information). If you have questions about a medical condition or this instruction, always ask your healthcare professional. [place] [address] any warranty or liability for your use of this information. Baker's Cyst: Care Instructions  Your Care Instructions    A Baker's cyst is a swelling behind the knee. It may cause pain or stiffness when you bend your knee or straighten it all the way. Baker's cysts are also called popliteal cysts. If you have arthritis or another condition that is the cause of the Baker's cyst, your doctor may treat that condition. A Baker's cyst may go away on its own. If not, or if it is causing a lot of discomfort, your doctor may drain the fluid that has built up behind the knee. In some cases, a Baker's cyst is removed in surgery. There are things you can do at home, such as staying off your leg, to reduce the swelling and pain. Follow-up care is a key part of your treatment and safety. Be sure to make and go to all appointments, and call your doctor if you are having problems. It's also a good idea to know your test results and keep a list of the medicines you take. How can you care for yourself at home? · Rest your knee as much as possible. · Ask your doctor if you can take an over-the-counter pain medicine, such as acetaminophen (Tylenol), ibuprofen (Advil, Motrin), or naproxen (Aleve). Be safe with medicines.  Read and follow all instructions on the label.  · Use a cane, a crutch, a walker, or another device if you need help to get around. These can help rest your knees. · If you have an elastic bandage, make sure it is snug but not so tight that your leg is numb, tingles, or swells below the bandage. Ask your doctor if you can loosen the bandage if it is too tight. · Follow your doctor's instructions about how much weight you can put on your knee. · Stay at a healthy weight. Being overweight puts extra strain on your knee. When should you call for help? Call 911 anytime you think you may need emergency care. For example, call if:  ? · You have chest pain, are short of breath, or you cough up blood. ?Call your doctor now or seek immediate medical care if:  ? · You have new or worse pain. ? · Your foot is cool or pale or changes color. ? · You have tingling, weakness, or numbness in your foot or toes. ? · You have signs of a blood clot in your leg (called a deep vein thrombosis), such as:  ¨ Pain in your calf, back of the knee, thigh, or groin. ¨ Redness or swelling in your leg. ? Watch closely for changes in your health, and be sure to contact your doctor if:  ? · You do not get better as expected. Where can you learn more? Go to http://radha-roney.info/. Enter O330 in the search box to learn more about \"Baker's Cyst: Care Instructions. \"  Current as of: March 21, 2017  Content Version: 11.4  © 7177-4783 [employer]. Care instructions adapted under license by [employer] (which disclaims liability or warranty for this information). If you have questions about a medical condition or this instruction, always ask your healthcare professional. [address] any warranty or liability for your use of this information. Joint Injections: Care Instructions  Your Care Instructions  Joint injections are shots into a joint, such as the knee.  They may be used to put in medicines, such as pain relievers. Or they can be used to take out fluid. Sometimes the fluid is tested in a lab. This can help find the cause of a joint problem. A corticosteroid, or steroid, shot is used to reduce inflammation in tendons or joints. It is often used to treat problems such as arthritis, tendinitis, and bursitis. Steroids can be injected directly into a painful, inflamed joint. They can also help reduce inflammation of a bursa. A bursa is a sac of fluid. It cushions and lubricates areas where tendons, ligaments, skin, muscles, or bones rub against each other. A steroid shot can sometimes help with short-term pain relief when other treatments haven't worked. If steroid shots help, pain may improve for weeks or months. Follow-up care is a key part of your treatment and safety. Be sure to make and go to all appointments, and call your doctor if you are having problems. It's also a good idea to know your test results and keep a list of the medicines you take. How can you care for yourself at home? · Put ice or a cold pack on the area for 10 to 20 minutes at a time. Put a thin cloth between the ice and your skin. · Take anti-inflammatory medicines to reduce pain, swelling, or inflammation. These include ibuprofen (Advil, Motrin) and naproxen (Aleve). Read and follow all instructions on the label. · Avoid strenuous activities for several days, especially those that put stress on the area where you got the shot. · If you have dressings over the area, keep them clean and dry. You may remove them when your doctor tells you to. When should you call for help? Call your doctor now or seek immediate medical care if:  ? · You have signs of infection, such as:  ¨ Increased pain, swelling, warmth, or redness. ¨ Red streaks leading from the site. ¨ Pus draining from the site. ¨ A fever. ? Watch closely for changes in your health, and be sure to contact your doctor if you have any problems.   Where can you learn more?  Go to http://radha-roney.info/. Enter N616 in the search box to learn more about \"Joint Injections: Care Instructions. \"  Current as of: March 21, 2017  Content Version: 11.4  © 1666-2896 [employer]. Care instructions adapted under license by [employer] (which disclaims liability or warranty for this information). If you have questions about a medical condition or this instruction, always ask your healthcare professional. [address] any warranty or liability for your use of this information.

## 2018-06-18 NOTE — PROGRESS NOTES
Patient: Dilia Beach                MRN: [id number]       SSN: [id number]  YOB: 1975        AGE: 36 y.o. SEX: female    PCP: Maureen Pinto MD  06/18/18    Chief Complaint   Patient presents with    Knee Pain     Right     HISTORY:  Dilia Beach is a 36 y.o. female who is seen for right knee pain. She feels a stiffness and catching in her lateral and posterior right knee. She has been experiencing symptoms for the past two months. Dr. Denisha Shea previously recommended PT for her right knee. If she sits too long she feels stiffness when she gets up. She reports startup pain. She notes pain with standing and walking. Pain Assessment  6/18/2018   Location of Pain Knee   Location Modifiers Right   Severity of Pain 0   Aggravating Factors Walking;Standing   Limiting Behavior Some   Relieving Factors Rest   Result of Injury No     Occupation, etc:  Ms. Jack Delgado is the  for [employer]. She lives in [place] with her  and her three year old daughter. She has a stepson in her 25s and a grandson who is about 3years old. She is going on a trip to [facility] with her family in a few months. Current weight is 182 pounds. She is 5'5\" tall. No results found for: HBA1C, HGBE8, WEO5OYVC, YNR9ZPVQ, QHV0TOMF  Weight Metrics 6/18/2018 5/24/2018 1/25/2018 9/27/2016 9/8/2016 5/12/2016 2/5/2015   Weight 182 lb 6.4 oz 182 lb 178 lb 174 lb 174 lb 176 lb 173 lb   BMI 30.35 kg/m2 30.29 kg/m2 29.62 kg/m2 28.96 kg/m2 28.96 kg/m2 29.29 kg/m2 28.79 kg/m2       Patient Active Problem List   Diagnosis Code    Allergic rhinitis J30.9     REVIEW OF SYSTEMS: All Below are Negative except: See HPI   Constitutional: negative for fever, chills, and weight loss. Cardiovascular: negative for chest pain, claudication, leg swelling, SOB, TSAUFFER   Gastrointestinal: Negative for pain, N/V/C/D, Blood in stool or urine, dysuria,  hematuria, incontinence, pelvic pain.    Musculoskeletal: See HPI   Neurological: Negative for dizziness and weakness. Negative for headaches, Visual changes, confusion, seizures   Phychiatric/Behavioral: Negative for depression, memory loss, substance  abuse. Extremities: Negative for hair changes, rash, or skin lesion changes. Hematologic: Negative for bleeding problems, bruising, pallor or swollen lymph  nodes   Peripheral Vascular: No calf pain, no circulation deficits. Social History     Social History    Marital status:      Spouse name: N/A    Number of children: N/A    Years of education: N/A     Occupational History    assistant superintendant       Social History Main Topics    Smoking status: Never Smoker    Smokeless tobacco: Never Used    Alcohol use No    Drug use: No    Sexual activity: Yes     Partners: Male     Other Topics Concern    Not on file     Social History Narrative      Allergies   Allergen Reactions    Milk Diarrhea      Current Outpatient Prescriptions   Medication Sig    LOW-OGESTREL, 28, 0.3-30 mg-mcg tab     betamethasone (CELESTONE SOLUSPAN) 6 mg/mL injection 1 mL by Intra artICUlar route once for 1 dose.  bupivacaine, PF, (MARCAINE, PF,) 0.25 % (2.5 mg/mL) injection 4 mL by Intra artICUlar route once for 1 dose.  bupivacaine, PF, (MARCAINE, PF,) 0.25 % (2.5 mg/mL) injection 10 mL by Intra artICUlar route once for 1 dose.  estradiol (ESTRACE) 1 mg tablet Take 2 mg by mouth three (3) times daily.  leuprolide acetate (LUPRON SC) 20 mL by SubCUTAneous route daily.  valacyclovir (VALTREX) 500 mg tablet Take 500 mg by mouth two (2) times a day. Indications: PREVENTION OF HERPES ZOSTER IN IMMUNOCOMPROMISED PATIENT     No current facility-administered medications for this visit.        PHYSICAL EXAMINATION:  Visit Vitals    /65    Pulse 85    Temp 98.8 °F (37.1 °C) (Oral)    Resp 18    Ht 5' 5\" (1.651 m)    Wt 182 lb 6.4 oz (82.7 kg)    SpO2 99%    BMI 30.35 kg/m2      ORTHO EXAMINATION:  Examination Right knee Left knee   Skin Intact Intact   Range of motion 115-5 120-0   Effusion +2 -   Medial joint line tenderness + +   Lateral joint line tenderness - -   Popliteal tenderness - -   Osteophytes palpable - -   Normas - -   Patella crepitus - -   Anterior drawer - -   Lateral laxity - -   Medial laxity - -   Varus deformity - -   Valgus deformity - -   Pretibial edema +1 -   Calf tenderness - -   Popliteal cyst right knee      PROCEDURE:  After timeout and under sterile conditions, right knee aspirated minimal cc. After discussing treatment options, patient's right knee was injected with 4 cc Marcaine and 1/2 cc Celestone. Chart reviewed for the following:   Lavell Katz MD, have reviewed the History, Physical and updated the Allergic reactions for [address] performed immediately prior to start of procedure:  Lavell Katz MD, have performed the following reviews on [facility] prior to the start of the procedure:            * Patient was identified by name and date of birth   * Agreement on procedure being performed was verified  * Risks and Benefits explained to the patient  * Procedure site verified and marked as necessary  * Patient was positioned for comfort  * Consent was obtained     Time: 4:12 PM     Date of procedure: 6/18/2018    Procedure performed by:  Carolee Pink MD    Ms. Olivo tolerated the procedure well with no complications. RADIOGRAPHS:  XR RT KNEE 5/24/2018  IMPRESSION:  Moderate joint effusion.   -I have independently reviewed these images during this office visit. -Dr. Jim Book:  Three views - No fractures, moderate effusion, mild joint space narrowing, no osteophytes present. IKDC Grade B      IMPRESSION:      ICD-10-CM ICD-9-CM    1.  Primary osteoarthritis of right knee M17.11 715.16 betamethasone (CELESTONE SOLUSPAN) 6 mg/mL injection      BETAMETHASONE ACETATE & SODIUM PHOSPHATE INJECTION 3 MG EA.      DRAIN/INJECT LARGE JOINT/BURSA      bupivacaine, PF, (MARCAINE, PF,) 0.25 % (2.5 mg/mL) injection      bupivacaine, PF, (MARCAINE, PF,) 0.25 % (2.5 mg/mL) injection      DRAIN/INJECT LARGE JOINT/BURSA   2. Popliteal cyst, right M71.21 727.51    3. Knee effusion, right M25.461 719.06 betamethasone (CELESTONE SOLUSPAN) 6 mg/mL injection      BETAMETHASONE ACETATE & SODIUM PHOSPHATE INJECTION 3 MG EA.      DRAIN/INJECT LARGE JOINT/BURSA      bupivacaine, PF, (MARCAINE, PF,) 0.25 % (2.5 mg/mL) injection      bupivacaine, PF, (MARCAINE, PF,) 0.25 % (2.5 mg/mL) injection      DRAIN/INJECT LARGE JOINT/BURSA   4. Synovitis of knee M65.9 727.09      PLAN:  After timeout and under sterile conditions, right knee aspirated minimal clear fluid. After discussing treatment options, patient's right knee was injected with 4 cc Marcaine and 1/2 cc Celestone. She will follow up as needed. We discussed a possible right knee MRI in the future if pain continues.        Scribed by Jennifer Jimenez [address]) as dictated by Dimitri Pedraza MD

## 2018-06-18 NOTE — MR AVS SNAPSHOT
Mamaureen Laws 
 
 
 [address] [place] [address], [address] [facility] [address] 
[phone] Patient: Moncho Hoyt MRN: [id number] :1975 Visit Information Date & Time Provider Department Dept. Phone Encounter #  
 2018  3:00 PM Cesar Sloan MD [facility] [address] [phone] [id number] Follow-up Instructions Return if symptoms worsen or fail to improve. Your Appointments 2019  8:00 AM  
FOLLOW UP EXAM with Louie Ramires MD  
[facility] ([address]) Appt Note: cpe  
 [address] [facility] [address]  
[address] [facility] [address] [facility] [address] Upcoming Health Maintenance Date Due Influenza Age 5 to Adult 2018 BREAST CANCER SCRN MAMMOGRAM 2019 PAP AKA CERVICAL CYTOLOGY 2020 DTaP/Tdap/Td series (6 - Td) 10/27/2024 Allergies as of 2018  Review Complete On: 2018 By: Cesar Sloan MD  
  
 Severity Noted Reaction Type Reactions Milk  10/24/2014    Diarrhea Current Immunizations  Reviewed on 2018 Name Date DTaP 1979, 1975, 3/6/1975, 1975 Influenza Vaccine (Quad) PF 2016 MMR 1979, 1977 Poliovirus vaccine 1979, 1975, 3/6/1975, 1975 TB Skin Test (PPD) 1979, 1977 TB Skin Test (PPD) Intradermal 2016 Tdap 10/27/2014 11:12 AM  
  
 Not reviewed this visit You Were Diagnosed With   
  
 Codes Comments Primary osteoarthritis of right knee    -  Primary ICD-10-CM: M17.11 ICD-9-CM: 715.16 Popliteal cyst, right     ICD-10-CM: M71.21 
ICD-9-CM: 727.51 Knee effusion, right     ICD-10-CM: M25.461 ICD-9-CM: 719.06 Vitals  BP Pulse Temp Resp Height(growth percentile) Weight(growth percentile)  
 117/65 85 98.8 °F (37.1 °C) (Oral) 18 5' 5\" (1.651 m) 182 lb 6.4 oz (82.7 kg)  
 SpO2 BMI OB Status Smoking Status 99% 30.35 kg/m2 Having regular periods Never Smoker BMI and BSA Data Body Mass Index Body Surface Area  
 30.35 kg/m 2 1.95 m 2 Preferred Pharmacy Pharmacy Name Phone [facility], [address] [phone] Your Updated Medication List  
  
   
This list is accurate as of 6/18/18  4:20 PM.  Always use your most recent med list.  
  
  
  
  
 betamethasone 6 mg/mL injection Commonly known as:  CELESTONE SOLUSPAN  
1 mL by Intra artICUlar route once for 1 dose. * bupivacaine (PF) 0.25 % (2.5 mg/mL) injection Commonly known as:  MARCAINE (PF)  
4 mL by Intra artICUlar route once for 1 dose. * bupivacaine (PF) 0.25 % (2.5 mg/mL) injection Commonly known as:  MARCAINE (PF) 10 mL by Intra artICUlar route once for 1 dose. ESTRACE 1 mg tablet Generic drug:  estradiol Take 2 mg by mouth three (3) times daily. LOW-OGESTREL (28) 0.3-30 mg-mcg Tab Generic drug:  norgestrel-ethinyl estradiol LUPRON SC  
20 mL by SubCUTAneous route daily. VALTREX 500 mg tablet Generic drug:  valACYclovir Take 500 mg by mouth two (2) times a day. Indications: PREVENTION OF HERPES ZOSTER IN IMMUNOCOMPROMISED PATIENT * Notice: This list has 2 medication(s) that are the same as other medications prescribed for you. Read the directions carefully, and ask your doctor or other care provider to review them with you. We Performed the Following BETAMETHASONE ACETATE & SODIUM PHOSPHATE INJECTION 3 MG EA. [ \[facility]\""] DRAIN/INJECT LARGE JOINT/BURSA [id number] CPT(R)] DRAIN/INJECT LARGE JOINT/BURSA [id number] CPT(R)] Follow-up Instructions Return if symptoms worsen or fail to improve. Patient Instructions Knee Arthritis: Exercises Your Care Instructions Here are some examples of exercises for knee arthritis.  Start each exercise slowly. Ease off the exercise if you start to have pain. Your doctor or physical therapist will tell you when you can start these exercises and which ones will work best for you. How to do the exercises Knee flexion with heel slide 1. Lie on your back with your knees bent. 2. Slide your heel back by bending your affected knee as far as you can. Then hook your other foot around your ankle to help pull your heel even farther back. 3. Hold for about 6 seconds, then rest for up to 10 seconds. 4. Repeat 8 to 12 times. 5. Switch legs and repeat steps 1 through 4, even if only one knee is sore. [facility] 1. Sit with your affected leg straight and supported on the floor or a firm bed. Place a small, rolled-up towel under your knee. Your other leg should be bent, with that foot flat on the floor. 2. Tighten the thigh muscles of your affected leg by pressing the back of your knee down into the towel. 3. Hold for about 6 seconds, then rest for up to 10 seconds. 4. Repeat 8 to 12 times. 5. Switch legs and repeat steps 1 through 4, even if only one knee is sore. Straight-leg raises to the front 1. Lie on your back with your good knee bent so that your foot rests flat on the floor. Your affected leg should be straight. Make sure that your low back has a normal curve. You should be able to slip your hand in between the floor and the small of your back, with your palm touching the floor and your back touching the back of your hand. 2. Tighten the thigh muscles in your affected leg by pressing the back of your knee flat down to the floor. Hold your knee straight. 3. Keeping the thigh muscles tight and your leg straight, lift your affected leg up so that your heel is about 12 inches off the floor. Hold for about 6 seconds, then lower slowly. 4. Relax for up to 10 seconds between repetitions. 5. Repeat 8 to 12 times. 6. Switch legs and repeat steps 1 through 5, even if only one knee is sore. Active knee flexion 1. Lie on your stomach with your knees straight. If your kneecap is uncomfortable, roll up a washcloth and put it under your leg just above your kneecap. 2. Lift the foot of your affected leg by bending the knee so that you bring the foot up toward your buttock. If this motion hurts, try it without bending your knee quite as far. This may help you avoid any painful motion. 3. Slowly move your leg up and down. 4. Repeat 8 to 12 times. 5. Switch legs and repeat steps 1 through 4, even if only one knee is sore. Quadriceps stretch (facedown) 1. Lie flat on your stomach, and rest your face on the floor. 2. Wrap a towel or belt strap around the lower part of your affected leg. Then use the towel or belt strap to slowly pull your heel toward your buttock until you feel a stretch. 3. Hold for about 15 to 30 seconds, then relax your leg against the towel or belt strap. 4. Repeat 2 to 4 times. 5. Switch legs and repeat steps 1 through 4, even if only one knee is sore. Stationary exercise bike 1. If you do not have a stationary exercise bike at home, you can find one to ride at your local health club or community center. 2. Adjust the height of the bike seat so that your knee is slightly bent when your leg is extended downward. If your knee hurts when the pedal reaches the top, you can raise the seat so that your knee does not bend as much. 3. Start slowly. At first, try to do 5 to 10 minutes of cycling with little to no resistance. Then increase your time and the resistance bit by bit until you can do 20 to 30 minutes without pain. 4. If you start to have pain, rest your knee until your pain gets back to the level that is normal for you. Or cycle for less time or with less effort. Follow-up care is a key part of your treatment and safety.  Be sure to make and go to all appointments, and call your doctor if you are having problems. It's also a good idea to know your test results and keep a list of the medicines you take. Where can you learn more? Go to http://radha-roney.info/. Enter C159 in the search box to learn more about \"Knee Arthritis: Exercises. \" Current as of: March 21, 2017 Content Version: 11.4 © 5625-5761 [employer]. Care instructions adapted under license by [employer] (which disclaims liability or warranty for this information). If you have questions about a medical condition or this instruction, always ask your healthcare professional. [place] [address] any warranty or liability for your use of this information. Baker's Cyst: Care Instructions Your Care Instructions A Baker's cyst is a swelling behind the knee. It may cause pain or stiffness when you bend your knee or straighten it all the way. Baker's cysts are also called popliteal cysts. If you have arthritis or another condition that is the cause of the Baker's cyst, your doctor may treat that condition. A Baker's cyst may go away on its own. If not, or if it is causing a lot of discomfort, your doctor may drain the fluid that has built up behind the knee. In some cases, a Baker's cyst is removed in surgery. There are things you can do at home, such as staying off your leg, to reduce the swelling and pain. Follow-up care is a key part of your treatment and safety. Be sure to make and go to all appointments, and call your doctor if you are having problems. It's also a good idea to know your test results and keep a list of the medicines you take. How can you care for yourself at home? · Rest your knee as much as possible. · Ask your doctor if you can take an over-the-counter pain medicine, such as acetaminophen (Tylenol), ibuprofen (Advil, Motrin), or naproxen (Aleve). Be safe with medicines. Read and follow all instructions on the label. · Use a cane, a crutch, a walker, or another device if you need help to get around. These can help rest your knees. · If you have an elastic bandage, make sure it is snug but not so tight that your leg is numb, tingles, or swells below the bandage. Ask your doctor if you can loosen the bandage if it is too tight. · Follow your doctor's instructions about how much weight you can put on your knee. · Stay at a healthy weight. Being overweight puts extra strain on your knee. When should you call for help? Call 911 anytime you think you may need emergency care. For example, call if: 
? · You have chest pain, are short of breath, or you cough up blood. ?Call your doctor now or seek immediate medical care if: 
? · You have new or worse pain. ? · Your foot is cool or pale or changes color. ? · You have tingling, weakness, or numbness in your foot or toes. ? · You have signs of a blood clot in your leg (called a deep vein thrombosis), such as: 
¨ Pain in your calf, back of the knee, thigh, or groin. ¨ Redness or swelling in your leg. ? Watch closely for changes in your health, and be sure to contact your doctor if: 
? · You do not get better as expected. Where can you learn more? Go to http://radha-roney.info/. Enter P351 in the search box to learn more about \"Baker's Cyst: Care Instructions. \" Current as of: March 21, 2017 Content Version: 11.4 © 3524-1693 [employer]. Care instructions adapted under license by [employer] (which disclaims liability or warranty for this information). If you have questions about a medical condition or this instruction, always ask your healthcare professional. [address] any warranty or liability for your use of this information. Joint Injections: Care Instructions Your Care Instructions Joint injections are shots into a joint, such as the knee.  They may be used to put in medicines, such as pain relievers. Or they can be used to take out fluid. Sometimes the fluid is tested in a lab. This can help find the cause of a joint problem. A corticosteroid, or steroid, shot is used to reduce inflammation in tendons or joints. It is often used to treat problems such as arthritis, tendinitis, and bursitis. Steroids can be injected directly into a painful, inflamed joint. They can also help reduce inflammation of a bursa. A bursa is a sac of fluid. It cushions and lubricates areas where tendons, ligaments, skin, muscles, or bones rub against each other. A steroid shot can sometimes help with short-term pain relief when other treatments haven't worked. If steroid shots help, pain may improve for weeks or months. Follow-up care is a key part of your treatment and safety. Be sure to make and go to all appointments, and call your doctor if you are having problems. It's also a good idea to know your test results and keep a list of the medicines you take. How can you care for yourself at home? · Put ice or a cold pack on the area for 10 to 20 minutes at a time. Put a thin cloth between the ice and your skin. · Take anti-inflammatory medicines to reduce pain, swelling, or inflammation. These include ibuprofen (Advil, Motrin) and naproxen (Aleve). Read and follow all instructions on the label. · Avoid strenuous activities for several days, especially those that put stress on the area where you got the shot. · If you have dressings over the area, keep them clean and dry. You may remove them when your doctor tells you to. When should you call for help? Call your doctor now or seek immediate medical care if: 
? · You have signs of infection, such as: 
¨ Increased pain, swelling, warmth, or redness. ¨ Red streaks leading from the site. ¨ Pus draining from the site. ¨ A fever. ? Watch closely for changes in your health, and be sure to contact your doctor if you have any problems. Where can you learn more? Go to http://radha-roney.info/. Enter N616 in the search box to learn more about \"Joint Injections: Care Instructions. \" Current as of: March 21, 2017 Content Version: 11.4 © 9618-8469 [employer]. Care instructions adapted under license by [employer] (which disclaims liability or warranty for this information). If you have questions about a medical condition or this instruction, always ask your healthcare professional. [address] any warranty or liability for your use of this information. Introducing [facility] & HEALTH SERVICES! Dear Manfred Griffin: Thank you for requesting a Pramana account. Our records indicate that you already have an active Pramana account. You can access your account anytime at [email or web address] Did you know that you can access your hospital and ER discharge instructions at any time in Pramana? You can also review all of your test results from your hospital stay or ER visit. Additional Information If you have questions, please visit the Frequently Asked Questions section of the Pramana website at [email or web address] Remember, Pramana is NOT to be used for urgent needs. For medical emergencies, dial 911. Now available from your iPhone and Android! Please provide this summary of care documentation to your next provider. Your primary care clinician is listed as Sabina Wilde. If you have any questions after today's visit, please call [phone].

## 2018-06-28 ENCOUNTER — TELEPHONE (OUTPATIENT)
Dept: ORTHOPEDIC SURGERY | Facility: CLINIC | Age: 43
End: 2018-06-28

## 2018-06-28 NOTE — TELEPHONE ENCOUNTER
Patient called in requesting to have an order put in for physical therapy for her right knee. She is requesting to go to [facility] for [facility] as she has been there before. Please advise patient at [phone].

## 2018-09-12 ENCOUNTER — OFFICE VISIT (OUTPATIENT)
Dept: FAMILY MEDICINE CLINIC | Age: 43
End: 2018-09-12

## 2018-09-12 ENCOUNTER — HOSPITAL ENCOUNTER (OUTPATIENT)
Dept: GENERAL RADIOLOGY | Age: 43
Discharge: HOME | End: 2018-09-12
Payer: OTHER GOVERNMENT

## 2018-09-12 VITALS
BODY MASS INDEX: 31.49 KG/M2 | WEIGHT: 189 LBS | RESPIRATION RATE: 16 BRPM | OXYGEN SATURATION: 99 % | HEART RATE: 79 BPM | SYSTOLIC BLOOD PRESSURE: 124 MMHG | TEMPERATURE: 98.3 F | HEIGHT: 65 IN | DIASTOLIC BLOOD PRESSURE: 78 MMHG

## 2018-09-12 DIAGNOSIS — M22.2X1 PATELLOFEMORAL DISORDER OF BOTH KNEES: Primary | ICD-10-CM

## 2018-09-12 DIAGNOSIS — M22.2X2 PATELLOFEMORAL DISORDER OF BOTH KNEES: ICD-10-CM

## 2018-09-12 DIAGNOSIS — M22.2X2 PATELLOFEMORAL DISORDER OF BOTH KNEES: Primary | ICD-10-CM

## 2018-09-12 DIAGNOSIS — M22.2X1 PATELLOFEMORAL DISORDER OF BOTH KNEES: ICD-10-CM

## 2018-09-12 PROCEDURE — 73562 X-RAY EXAM OF KNEE 3: CPT

## 2018-09-12 RX ORDER — DICLOFENAC SODIUM 10 MG/G
GEL TOPICAL 4 TIMES DAILY
Qty: 100 G | Refills: 0 | Status: SHIPPED | OUTPATIENT
Start: 2018-09-12 | End: 2018-10-17 | Stop reason: SDUPTHER

## 2018-09-12 NOTE — PATIENT INSTRUCTIONS
Patellofemoral Pain Syndrome: Care Instructions Your Care Instructions Patellofemoral pain syndrome is pain in the front of the knee. It is caused by overuse, weak thigh muscles (quadriceps), or a problem with the way the kneecap moves. Extra weight may also cause this syndrome. The patella is the kneecap, and the femur is the thighbone. Some people may have pain in the front of the knee from a condition called chondromalacia. In this problem, the underside of the knee cartilage wears down and frays. Cartilage is a rubbery tissue that cushions joints. In some cases, the kneecap does not move, or track, in a normal way. You may have knee pain when you run, walk down hills or steps, or do another activity. Sitting for a long time also can cause knee pain. Your knee pain may get better with medicines for pain and swelling and exercises to make your quadriceps stronger. Losing weight, if you need to, may also help with pain. Follow-up care is a key part of your treatment and safety. Be sure to make and go to all appointments, and call your doctor if you are having problems. It's also a good idea to know your test results and keep a list of the medicines you take. How can you care for yourself at home? · Ask your doctor if you can take an over-the-counter pain medicine, such as acetaminophen (Tylenol), ibuprofen (Advil, Motrin), or naproxen (Aleve). Be safe with medicines. Read and follow all instructions on the label. · Rest and protect your knee. Take a break from activities that cause pain, such as long periods of sitting or kneeling. · Put ice or a cold pack on your knee for 10 to 20 minutes after activity. Put a thin cloth between the ice and your skin. · If your doctor recommends an elastic bandage, sleeve, or other type of support for your knee, wear it as directed.  
· If your knee is not swollen, you can put moist heat, a heating pad, or a warm cloth on your knee. After several days of rest, you can begin gentle exercise of your knee. · Reach and stay at a healthy weight. Being overweight puts stress on your knees. · Wear athletic shoes that offer good support, especially if you run. · Use shoe inserts, or orthotics, if they help reduce your knee pain. Many drugstores and shoe stores sell them. · See a physical therapist to learn more exercises and stretches to make your legs stronger. When should you call for help? Watch closely for changes in your health, and be sure to contact your doctor if: 
  · Your knee pain does not get better or gets worse. Where can you learn more? Go to http://radha-roney.info/. Enter B940 in the search box to learn more about \"Patellofemoral Pain Syndrome: Care Instructions. \" Current as of: November 29, 2017 Content Version: 11.7 © 4968-3186 [employer]. Care instructions adapted under license by [employer] (which disclaims liability or warranty for this information). If you have questions about a medical condition or this instruction, always ask your healthcare professional. [address] any warranty or liability for your use of this information.

## 2018-09-12 NOTE — PROGRESS NOTES
SUBJECTIVE Chief Complaint Patient presents with  Knee Pain  
  L>R; denies injury; taking OTC Motrin with minor relief; aggravated by prolonged sitting, walking, and standing The patient presents complaining of a recent onset of left knee pain. She has had some relief of her right knee pain after her corticosteroid injection but still has pain in that one as well. She was referred to PT in June but has not gone yet. Location: peripatellar, migrates around knee Quality: sore, tight / stiffness Injury: denies but got a lot worse after a trip to [facility] where she was walking 6-8 hours for several days. Radiation: denies Swelling: feels swollen Aggravating factors: walking, especially stairs Relieving factors: Motrin Has tried: Motrin X-rays: not yet PT: not yet OBJECTIVE Blood pressure 124/78, pulse 79, temperature 98.3 °F (36.8 °C), temperature source Oral, resp. rate 16, height 5' 5\" (1.651 m), weight 189 lb (85.7 kg), SpO2 99 %. General:  alert, cooperative, well appearing, in no apparent distress. Left Knee: Inspection of the knee demonstrates there is no obvious deformity. There is a 1+ effusion. There is no evidence of dislocation. There is normal flexion and extension of the bilateral knee but these are painful and there is crepitus. Assessment of ligamentous stability demonstrates the ACL/PCL/LCL/MCL are all intact. Palpation demonstrates there is no medial or lateral joint line tenderness. There is no tenderness of the pes anserine bursa. There is no tenderness along the tibial tuberosity. The patellar tendon is sensitive to light palpation. There is apprehension with patellar subluxation. There is significant pain with rocking and grinding. Gait is antalgic with favoring of the right leg. Skin:  There is no redness or warmth. Psych: normal affect. Mood good. Oriented x 3. Judgement and insight intact. ASSESSMENT / PLAN 
  ICD-10-CM ICD-9-CM 1. Patellofemoral joint pain, left M25.562 189.46   
X-rays ordered. Ice as needed. Advised on activity modification. Refer to PT. All chart history elements were reviewed by me at the time of the visit even though marked at time of note closure. Patient understands our medical plan. Patient has provided input and agrees with goals. Alternatives have been explained and offered. All questions answered. The patient is to call if condition worsens or fails to improve. Follow-up Disposition: 
Return 5-6 weeks for knee pain. X-rays due at earliest convenience.

## 2018-09-12 NOTE — PROGRESS NOTES
Chief Complaint Patient presents with  Knee Pain  
  L>R; denies injury; taking OTC Motrin with minor relief; aggravated by prolonged sitting, walking, and standing 1. Have you been to the ER, urgent care clinic since your last visit? Hospitalized since your last visit? No 
 
2. Have you seen or consulted any other health care providers outside of the [address] [place] [address] since your last visit? Include any pap smears or colon screening. Yes Where: 6/2018- [facility] (cortisone in right knee and fluid drainage)

## 2018-09-12 NOTE — MR AVS SNAPSHOT
[address] [facility] [address] [facility] 
[phone] Patient: Ramon Torres MRN: [id number] :1975 Visit Information Date & Time Provider Department Dept. Phone Encounter #  
 2018  3:15 PM Efrem Bull, [address] [facility] [id number] Follow-up Instructions Return 5-6 weeks for knee pain. X-rays due at earliest convenience. Your Appointments 2019  8:00 AM  
FOLLOW UP EXAM with Efrem Bull MD  
 [facility] ([address] [facility]) Appt Note: cpe  
 [address] [place] [address]  
[address] [facility] [facility] [address] [place] [address] Upcoming Health Maintenance Date Due Influenza Age 5 to Adult 2018 BREAST CANCER SCRN MAMMOGRAM 2019 PAP AKA CERVICAL CYTOLOGY 2020 DTaP/Tdap/Td series (6 - Td) 10/27/2024 Allergies as of 2018  Review Complete On: 2018 By: Efrem Bull MD  
  
 Severity Noted Reaction Type Reactions Milk  10/24/2014    Diarrhea Current Immunizations  Reviewed on 2018 Name Date DTaP 1979, 1975, 3/6/1975, 1975 Influenza Vaccine (Quad) PF 2016 MMR 1979, 1977 Poliovirus vaccine 1979, 1975, 3/6/1975, 1975 TB Skin Test (PPD) 1979, 1977 TB Skin Test (PPD) Intradermal 2016 Tdap 10/27/2014 11:12 AM  
  
 Not reviewed this visit You Were Diagnosed With   
  
 Codes Comments Patellofemoral disorder of both knees    -  Primary ICD-10-CM: M22.2X1, M22.2X2 ICD-9-CM: 719.96 Vitals BP Pulse Temp Resp Height(growth percentile) Weight(growth percentile) 124/78 (BP 1 Location: Left arm, BP Patient Position: Sitting) 79 98.3 °F (36.8 °C) (Oral) 16 5' 5\" (1.651 m) 189 lb (85.7 kg) SpO2 BMI OB Status Smoking Status 99% 31.45 kg/m2 Having regular periods Never Smoker Vitals History BMI and BSA Data Body Mass Index Body Surface Area  
 31.45 kg/m 2 1.98 m 2 Preferred Pharmacy Pharmacy Name Phone [facility], [address] [place] [address] Road [phone] Your Updated Medication List  
  
   
This list is accurate as of 9/12/18  3:47 PM.  Always use your most recent med list.  
  
  
  
  
 diclofenac 1 % Gel Commonly known as:  VOLTAREN Apply  to affected area four (4) times daily. ESTRACE 1 mg tablet Generic drug:  estradiol Take 2 mg by mouth three (3) times daily. LOW-OGESTREL (28) 0.3-30 mg-mcg Tab Generic drug:  norgestrel-ethinyl estradiol LUPRON SC  
20 mL by SubCUTAneous route daily. PRENATAL PO Take 1 Tab by mouth daily. VALTREX 500 mg tablet Generic drug:  valACYclovir Take 500 mg by mouth two (2) times a day. Indications: PREVENTION OF HERPES ZOSTER IN IMMUNOCOMPROMISED PATIENT Prescriptions Sent to Pharmacy Refills  
 diclofenac (VOLTAREN) 1 % gel 0 Sig: Apply  to affected area four (4) times daily. Class: Normal  
 Pharmacy: [facility], [address] [place] [address] Ph #: [phone] Route: Topical  
  
We Performed the Following REFERRAL TO PHYSICAL THERAPY [YYW52 Custom] Follow-up Instructions Return 5-6 weeks for knee pain. X-rays due at earliest convenience. To-Do List   
 09/12/2018 Imaging:  XR KNEE LT 3 V Referral Information Referral ID Referred By Referred To  
  
 [id number] Olga Lidia [facility] VIEW   
   [address] [facility] [address], [address] [facility] Phone: [phone] Fax: [phone] Visits Status Start Date End Date 1 New Request 9/12/18 9/12/19  If your referral has a status of pending review or denied, additional information will be sent to support the outcome of this decision. Patient Instructions Patellofemoral Pain Syndrome: Care Instructions Your Care Instructions Patellofemoral pain syndrome is pain in the front of the knee. It is caused by overuse, weak thigh muscles (quadriceps), or a problem with the way the kneecap moves. Extra weight may also cause this syndrome. The patella is the kneecap, and the femur is the thighbone. Some people may have pain in the front of the knee from a condition called chondromalacia. In this problem, the underside of the knee cartilage wears down and frays. Cartilage is a rubbery tissue that cushions joints. In some cases, the kneecap does not move, or track, in a normal way. You may have knee pain when you run, walk down hills or steps, or do another activity. Sitting for a long time also can cause knee pain. Your knee pain may get better with medicines for pain and swelling and exercises to make your quadriceps stronger. Losing weight, if you need to, may also help with pain. Follow-up care is a key part of your treatment and safety. Be sure to make and go to all appointments, and call your doctor if you are having problems. It's also a good idea to know your test results and keep a list of the medicines you take. How can you care for yourself at home? · Ask your doctor if you can take an over-the-counter pain medicine, such as acetaminophen (Tylenol), ibuprofen (Advil, Motrin), or naproxen (Aleve). Be safe with medicines. Read and follow all instructions on the label. · Rest and protect your knee. Take a break from activities that cause pain, such as long periods of sitting or kneeling. · Put ice or a cold pack on your knee for 10 to 20 minutes after activity. Put a thin cloth between the ice and your skin. · If your doctor recommends an elastic bandage, sleeve, or other type of support for your knee, wear it as directed. · If your knee is not swollen, you can put moist heat, a heating pad, or a warm cloth on your knee. After several days of rest, you can begin gentle exercise of your knee. · Reach and stay at a healthy weight. Being overweight puts stress on your knees. · Wear athletic shoes that offer good support, especially if you run. · Use shoe inserts, or orthotics, if they help reduce your knee pain. Many drugstores and shoe stores sell them. · See a physical therapist to learn more exercises and stretches to make your legs stronger. When should you call for help? Watch closely for changes in your health, and be sure to contact your doctor if: 
  · Your knee pain does not get better or gets worse. Where can you learn more? Go to http://radha-roney.info/. Enter W439 in the search box to learn more about \"Patellofemoral Pain Syndrome: Care Instructions. \" Current as of: November 29, 2017 Content Version: 11.7 © 0081-4024 [employer]. Care instructions adapted under license by [employer] (which disclaims liability or warranty for this information). If you have questions about a medical condition or this instruction, always ask your healthcare professional. [place] [address] any warranty or liability for your use of this information. Introducing [facility]! Dear Samia Austin: Thank you for requesting a nooked account. Our records indicate that you already have an active nooked account. You can access your account anytime at [email or web address] Did you know that you can access your hospital and ER discharge instructions at any time in nooked? You can also review all of your test results from your hospital stay or ER visit. Additional Information If you have questions, please visit the Frequently Asked Questions section of the nooked website at [email or web address] Remember, ADARTIShart is NOT to be used for urgent needs. For medical emergencies, dial 911. Now available from your iPhone and Android! Please provide this summary of care documentation to your next provider. Your primary care clinician is listed as Shashi Rodriguez. If you have any questions after today's visit, please call [phone].

## 2018-09-12 NOTE — LETTER
9/12/2018 3:44 PM 
 
Ms. Dora Larson [address] [place] [address] To Whom It May Concern, Please allow wearing sneakers at work due to knee pain. Sincerely, Arlette Alvarez MD

## 2018-09-21 ENCOUNTER — HOSPITAL ENCOUNTER (OUTPATIENT)
Dept: PHYSICAL THERAPY | Age: 43
Discharge: HOME | End: 2018-09-21
Payer: OTHER GOVERNMENT

## 2018-09-21 PROCEDURE — 97161 PT EVAL LOW COMPLEX 20 MIN: CPT

## 2018-09-21 PROCEDURE — 97140 MANUAL THERAPY 1/> REGIONS: CPT

## 2018-09-21 PROCEDURE — 97535 SELF CARE MNGMENT TRAINING: CPT

## 2018-09-21 NOTE — PROGRESS NOTES
PT DAILY TREATMENT NOTE/KNEE EVAL 3-16    Patient Name: Ru Ramirez  Date:2018  : 1975  [x]  Patient  Verified  Payor:  / Plan: Dewayne Angulo 73 / Product Type:  /    In time:3:33pm  Out time:4:17pm  Total Treatment Time (min): 44  Total Timed Codes (min): 17  1:1 Treatment Time ( only): 40   Visit #: 1 of 8    Treatment Area: Patellofemoral disorders, left knee [M22.2X2]    SUBJECTIVE  Pain Level (0-10 scale): 0@ rest, 2 with movement  []constant [x]intermittent []improving [x]worsening []no change since onset    Any medication changes, allergies to medications, adverse drug reactions, diagnosis change, or new procedure performed?: [x] No    [] Yes (see summary sheet for update)  Subjective functional status/changes:     Pt is a 39year old female who reports onset of bilateral knee pain over the last several months that she reported occurred after multiple bouts of prolonged walking several miles. She reports her right knee was initially more painful, but has since undergone an aspiration and cortizone injection and currently has minimal discomfort, while her left knee has recently been more painful. She reports the pain is worst after prolonged sitting/inactivity, has intermittent knee swelling, and reports painful stair negotiation necessitating that she take 1 step at a time.     PLOF: sedentary, some limited walking for exercise void of knee pain limitations  Limitations to PLOF: limited walking, stairs, and squatting tolerance  Mechanism of Injury: see above  Current symptoms/Complaints: see above  Previous Treatment/Compliance: R knee cortizone injection and aspiration, x-rays  PMHx/Surgical Hx: unremarkable per pt report  Work Hx: see intake  Living Situation:   Pt Goals: see intake  Barriers: []pain []financial []time []transportation []other  Motivation: appears motivated and cooperative  Substance use: []Alcohol []Tobacco []other:   FABQ Score: []low []elevate  Cognition: A & O x 4    Other:    OBJECTIVE/EXAMINATION  Domestic Life:   Activity/Recreational Limitations:   Mobility:   Self Care:     27 min [x]Eval                  []Re-Eval     8 min Manual Therapy:  Patellar lift taping with KT   Rationale: decrease pain and increase ROM to improve ease of ambulation and stair negotation    Self Care: 9 minutes pt education regarding relevant anatomy, diagnosis, prognosis, plan of care, activity modification including low impact low load exercise, and self modality use to facilitate pt self management.         With   [] TE   [] TA   [] neuro   [] other: Patient Education: [x] Review HEP    [] Progressed/Changed HEP based on:   [] positioning   [] body mechanics   [] transfers   [] heat/ice application    [] other:      Other Objective/Functional Measures:    Physical Therapy Evaluation - Knee    Posture: [] Varus    [] Valgus    [] Recurvatum        [] Tibial Torsion    [] Foot Supination    [] Foot Pronation    Describe:    Gait:  [x] Normal    [] Abnormal    [] Antalgic    [] NWB    Device:    Describe:    ROM / Strength  [] Unable to assess                  AROM                      PROM                   Strength (1-5)    Left Right Left Right Left Right   Hip Flexion     5 5    Extension     4 4    Abduction     4- 4-    Adduction         Knee Flexion 84 130 110 limited knee pain 132 4 knee pain 4+    Extension 0 0   4 knee pain 4+   Ankle Plantarflexion     5 5    Dorsiflexion     5 5       Flexibility: [] Unable to assess at this time  Hamstrings:    (L) Tightness= [x] WNL   [] Min   [] Mod   [] Severe    (R) Tightness= [x] WNL   [] Min   [] Mod   [] Severe  Quadriceps:    (L) Tightness= [] WNL   [x] Min   [] Mod   [] Severe    (R) Tightness= [x] WNL   [] Min   [] Mod   [] Severe  Gastroc:      (L) Tightness= [x] WNL   [] Min   [] Mod   [] Severe    (R) Tightness= [x] WNL   [] Min   [] Mod   [] Severe  Other:    Palpation:   Neg/Pos  Neg/Pos  Neg/Pos   Joint Line (+) L Quad tendon  Patellar ligament    Patella (+) L Fibular head  Pes Anserinus    Tibial tubercle  Hamstring tendons  Infrapatellar fat pad      Optional Tests:  Patellar Positioning (Static)   []L []R Normal []L []R Lateral   []L []R Kellie Nim      []L []R Medial   []L []R Baja    Patellar Tracking   []L []R Glide (Lat)   []L []R Tilt (Lat)     []L []R Glide (Med)  []L []R Tilt (Med)      []L []R Tile (Inf)     Patellar Mobility   [x]L [x]R Hypermobile []L []R Hypomobile         Girth Measurements:     Cm at  Cm above joint line   Cm at   Cm below joint line  Cm at joint line   Left        Right           Lachmans  [x] Neg    [] Pos Posterior Drawer [x] Neg    [] Pos  Pivot Shift  [] Neg    [] Pos Posterior Sag  [] Neg    [] Pos  MARYLU   [] Neg    [] Pos Juan's Test [] Neg    [] Pos  ALRI   [] Neg    [] Pos Squat   [] Neg    [x] Pos  Valgus@ 0 Degrees [x] Neg    [] Pos Norma-Ryan [x] Neg    [] Pos  Valgus@ 30 Degrees [x] Neg    [] Pos Patellar Apprehension [x] Neg    [] Pos  Varus@ 0 Degrees [x] Neg    [] Pos Duron's Compression [] Neg    [] Pos  Varus@ 30 Degrees [x] Neg    [] Pos Ely's Test  [x] Neg    [] Pos  Apley's Compression [] Neg    [] Pos Silva's Test  [] Neg    [] Pos  Apley's Distraction [] Neg    [] Pos Stroke Test  [] Neg    [] Pos   Anterior Drawer [x] Neg    [] Pos Fluctuation Test [] Neg    [] Pos  Other:  Patellar grind                 [] Neg    [x] Pos Left                Other tests/comments:  Non-reciprocal stair negotiation, poor eccentric control, improved post patellar taping  Squat: limited to 30 degrees 2/2 pain  Pain Level (0-10 scale) post treatment: 1 with movement, 0 @ rest    ASSESSMENT/Changes in Function: Per POC.     Patient will continue to benefit from skilled PT services to modify and progress therapeutic interventions, address functional mobility deficits, address ROM deficits, address strength deficits, analyze and address soft tissue restrictions, analyze and cue movement patterns, analyze and modify body mechanics/ergonomics and instruct in home and community integration to attain remaining goals. [x]  See Plan of Care  []  See progress note/recertification  []  See Discharge Summary         Progress towards goals / Updated goals:  PEr POC.     PLAN  [x]  Upgrade activities as tolerated     [x]  Continue plan of care  []  Update interventions per flow sheet       []  Discharge due to:_  []  Other:_      Savannah Dias, PT, DPT, ATC 9/21/2018  3:31 PM

## 2018-09-21 NOTE — PROGRESS NOTES
[facility]  [address]  [place], [address]  [phone] fax    Plan of Care/ Statement of Necessity for Physical Therapy Services    Patient name: Leora Spence Start of Care: 2018   Referral source: Meredith Wallis MD : 1975    Medical Diagnosis: Patellofemoral disorders, left knee [M22.2X2]   Onset Date:several months ago    Treatment Diagnosis: patellofemoral pain   Prior Hospitalization: see medical history Provider#: [id number]   Medications: Verified on Patient summary List    Comorbidities: unremarkable   Prior Level of Function: sedentary, some limited walking for exercise void of knee pain limitations     The Plan of Care and following information is based on the information from the initial evaluation. Assessment/ key information: Pt is a 39year old female who reports onset of bilateral knee pain over the last several months that she reported occurred after multiple bouts of prolonged walking several miles. She reports her right knee was initially more painful, but has since undergone an aspiration and cortizone injection and currently has minimal discomfort, while her left knee has recently been more painful. She reports the pain is worst after prolonged sitting/inactivity, has intermittent knee swelling, and reports painful stair negotiation necessitating that she take 1 step at a time. Signs and symptoms appear consistent with patellofemoral pain, primarily on the left at this time, with associated impairments consisting of (+) patellar grind test and patellar hypermobility, mild hip abd and ER weakness bilat, pain limited L knee strength and limited L knee flexion AROM/PROM. Pt will benefit from skilled PT management to address their impairments and improve their level of function.     Evaluation Complexity History LOW Complexity : Zero comorbidities / personal factors that will impact the outcome / POC; Examination MEDIUM Complexity : 3 Standardized tests and measures addressing body structure, function, activity limitation and / or participation in recreation  ;Presentation MEDIUM Complexity : Evolving with changing characteristics  ; Clinical Decision Making MEDIUM Complexity : FOTO score of 26-74  Overall Complexity Rating: LOW   Problem List: pain affecting function, decrease ROM, decrease strength, edema affecting function, impaired gait/ balance, decrease ADL/ functional abilitiies, decrease activity tolerance, decrease flexibility/ joint mobility and decrease transfer abilities   Treatment Plan may include any combination of the following: Therapeutic exercise, Therapeutic activities, Neuromuscular re-education, Physical agent/modality, Manual therapy, Aquatic therapy, Patient education and Self Care training  Patient / Family readiness to learn indicated by: asking questions, trying to perform skills and interest  Persons(s) to be included in education: patient (P)  Barriers to Learning/Limitations: None  Patient Goal (s): Strengthen muscle.   Patient Self Reported Health Status: good  Rehabilitation Potential: good    Short Term Goals: To be accomplished in 2 weeks:   1. Patient will report performance of HEP at least 2 times per day to facilitate improved outcomes and improved self management. Long Term Goals: To be accomplished in 4 weeks:   1. Patient will report FOTO score of 72 or better to indicate significant improvement in functional status. 2. Pt will demonstrate L knee AROM 0 - 125 or better to improve ease of functional mobility. 3. Pt will demonstrate left knee strength of 5/5 or better to improve ease of ADLs. 4. Pt will demonstrate ability to reciprocally negotiate stairs void of pain exacerbation to improve community mobility. Frequency / Duration: Patient to be seen 2 times per week for 4 weeks.     Patient/ Caregiver education and instruction: Diagnosis, prognosis, self care, activity modification and exercises   [x]  Plan of care has been reviewed with LAKEISHA Rodriguez 9/21/2018 7:14 PM    ________________________________________________________________________    I certify that the above Therapy Services are being furnished while the patient is under my care. I agree with the treatment plan and certify that this therapy is necessary.     Physician's Signature:____________Date:_________TIME:________    ** Signature, Date and Time must be completed for valid certification **    Please sign and return to [facility] [address] [place] [address] & [facility]  [address] [place] [address] [address]  [phone] fax

## 2018-09-24 NOTE — PROGRESS NOTES
Shan Wolfe [phone] (home) advising patient that knee x-ray showed mild arthritic changes. Lets see how much PT can help. She was asked to call [facility] if she has any further questions.

## 2018-09-26 ENCOUNTER — APPOINTMENT (OUTPATIENT)
Dept: PHYSICAL THERAPY | Age: 43
End: 2018-09-26
Payer: OTHER GOVERNMENT

## 2018-10-01 ENCOUNTER — APPOINTMENT (OUTPATIENT)
Dept: PHYSICAL THERAPY | Age: 43
End: 2018-10-01
Payer: OTHER GOVERNMENT

## 2018-10-03 ENCOUNTER — HOSPITAL ENCOUNTER (OUTPATIENT)
Dept: PHYSICAL THERAPY | Age: 43
Discharge: HOME | End: 2018-10-03
Payer: OTHER GOVERNMENT

## 2018-10-03 PROCEDURE — 97140 MANUAL THERAPY 1/> REGIONS: CPT

## 2018-10-03 PROCEDURE — 97110 THERAPEUTIC EXERCISES: CPT

## 2018-10-03 PROCEDURE — 97112 NEUROMUSCULAR REEDUCATION: CPT

## 2018-10-03 NOTE — PROGRESS NOTES
PT DAILY TREATMENT NOTE     Patient Name: Spencer Gamez  Date:10/3/2018  : 1975  [x]  Patient  Verified  Payor:  / Plan: Kevin Locket / Product Type:  /    In time:6:00pm  Out time:6:49pm  Total Treatment Time (min): 49  Visit #: 2 of 8    Treatment Area: Patellofemoral disorders, left knee [M22.2X2]    SUBJECTIVE  Pain Level (0-10 scale): 0  Any medication changes, allergies to medications, adverse drug reactions, diagnosis change, or new procedure performed?: [x] No    [] Yes (see summary sheet for update)  Subjective functional status/changes:   [] No changes reported  Pt reports intermittent pain, especially after prolonged sitting. OBJECTIVE  29 min Therapeutic Exercise:  [x] See flow sheet :   Rationale: increase ROM and increase strength to improve the patients ability to improve ease of ADLs. 10 min Neuromuscular Re-education:  [x]  See flow sheet : L knee patellar lift tape   Rationale: increase strength and improve coordination  to improve the patients ability to improve ease of daily activity. Modality rationale: decrease edema, decrease inflammation and decrease pain to improve the patients ability to improve ease of daily activity and ambulation.    Min Type Additional Details    [] Estim:  []Unatt       []IFC  []Premod                        []Other:  []w/ice   []w/heat  Position:  Location:    [] Estim: []Att    []TENS instruct  []NMES                    []Other:  []w/US   []w/ice   []w/heat  Position:  Location:    []  Traction: [] Cervical       []Lumbar                       [] Prone          []Supine                       []Intermittent   []Continuous Lbs:  [] before manual  [] after manual    []  Ultrasound: []Continuous   [] Pulsed                           []1MHz   []3MHz Location:  W/cm2:    []  Iontophoresis with dexamethasone         Location: [] Take home patch   [] In clinic    []  Ice     []  heat  []  Ice massage  []  Laser   [] Anodyne Position:  Location:    []  Laser with stim  []  Other: Position:  Location:   10 [x]  Vasopneumatic Device Pressure:       [] lo [x] med [] hi   Temperature: [x] lo [] med [] hi   [] Skin assessment post-treatment:  []intact []redness- no adverse reaction    []redness - adverse reaction:           With   [] TE   [] TA   [] neuro   [] other: Patient Education: [x] Review HEP    [] Progressed/Changed HEP based on:   [] positioning   [] body mechanics   [] transfers   [] heat/ice application    [] other:      Other Objective/Functional Measures: discomfort with L knee heelslides and limited end range flexion ROM     Pain Level (0-10 scale) post treatment: 0    ASSESSMENT/Changes in Function: Pt demonstrates some discomfort in her Left knee with ROM and OKC strength exercises, but reports good pain control with vasopneumatic cryocompression. Patient will continue to benefit from skilled PT services to modify and progress therapeutic interventions, address functional mobility deficits, address ROM deficits, address strength deficits, analyze and address soft tissue restrictions, analyze and cue movement patterns, analyze and modify body mechanics/ergonomics and instruct in home and community integration to attain remaining goals. []  See Plan of Care  []  See progress note/recertification  []  See Discharge Summary         Progress towards goals / Updated goals:  Short Term Goals: To be accomplished in 2 weeks:                         1. Patient will report performance of HEP at least 2 times per day to facilitate improved outcomes and improved self management.     Long Term Goals: To be accomplished in 4 weeks:                         1. Patient will report FOTO score of 72 or better to indicate significant improvement in functional status. 2. Pt will demonstrate L knee AROM 0 - 125 or better to improve ease of functional mobility.                          3. Pt will demonstrate left knee strength of 5/5 or better to improve ease of ADLs. 4. Pt will demonstrate ability to reciprocally negotiate stairs void of pain exacerbation to improve community mobility. Frequency / Duration: Patient to be seen 2 times per week for 4 weeks.     PLAN  []  Upgrade activities as tolerated     [x]  Continue plan of care  []  Update interventions per flow sheet       []  Discharge due to:_  []  Other:_      Rochelle Boeck, PT, DPT, ATC 10/3/2018  6:05 PM    Future Appointments  Date Time Provider Jono Cappsi   10/9/2018 5:30 PM [address] [place] [address]   10/11/2018 7:30 AM [address] [facility]   10/17/2018 2:45 PM Kartik Rhodes MD [address]   10/17/2018 4:30 PM Brendan Cardenas PT [facility]   10/19/2018 3:00 PM [address] [place] [address]   1/28/2019 8:00 AM Kartik Rhodes MD [facility] 10.

## 2018-10-09 ENCOUNTER — APPOINTMENT (OUTPATIENT)
Dept: PHYSICAL THERAPY | Age: 43
End: 2018-10-09
Payer: OTHER GOVERNMENT

## 2018-10-11 ENCOUNTER — HOSPITAL ENCOUNTER (OUTPATIENT)
Dept: PHYSICAL THERAPY | Age: 43
Discharge: HOME | End: 2018-10-11
Payer: OTHER GOVERNMENT

## 2018-10-11 PROCEDURE — 97016 VASOPNEUMATIC DEVICE THERAPY: CPT

## 2018-10-11 PROCEDURE — 97110 THERAPEUTIC EXERCISES: CPT

## 2018-10-11 PROCEDURE — 97112 NEUROMUSCULAR REEDUCATION: CPT

## 2018-10-11 NOTE — PROGRESS NOTES
PT DAILY TREATMENT NOTE     Patient Name: Lyric Rowley  Date:10/11/2018  : 1975  [x]  Patient  Verified  Payor:  / Plan: Mic Vera / Product Type:  /    In time:7:33  Out time:8:12  Total Treatment Time (min): 39  Visit #: 3 of 8    Treatment Area: Patellofemoral disorders, left knee [M22.2X2]    SUBJECTIVE  Pain Level (0-10 scale): 0  Any medication changes, allergies to medications, adverse drug reactions, diagnosis change, or new procedure performed?: [x] No    [] Yes (see summary sheet for update)  Subjective functional status/changes:   [] No changes reported  The patient reports she has some \"discomfort\" in her left knee but it is not bad.  States she did well after last session    OBJECTIVE    Modality rationale: decrease inflammation and decrease pain to improve the patients ability to perform ADLs   Min Type Additional Details    [] Estim:  []Unatt       []IFC  []Premod                        []Other:  []w/ice   []w/heat  Position:  Location:    [] Estim: []Att    []TENS instruct  []NMES                    []Other:  []w/US   []w/ice   []w/heat  Position:  Location:    []  Traction: [] Cervical       []Lumbar                       [] Prone          []Supine                       []Intermittent   []Continuous Lbs:  [] before manual  [] after manual    []  Ultrasound: []Continuous   [] Pulsed                           []1MHz   []3MHz W/cm2:  Location:    []  Iontophoresis with dexamethasone         Location: [] Take home patch   [] In clinic    []  Ice     []  heat  []  Ice massage  []  Laser   []  Anodyne Position:  Location:    []  Laser with stim  []  Other:  Position:  Location:   10 [x]  Vasopneumatic Device Pressure:       [] lo [x] med [] hi   Temperature: [x] lo [] med [] hi   [] Skin assessment post-treatment:  []intact []redness- no adverse reaction    []redness - adverse reaction:       21 min Therapeutic Exercise:  [] See flow sheet :   Rationale: increase ROM and increase strength to improve the patients ability to perform daily tasks and ADLs    8 min Neuromuscular Re-education:  []  See flow sheet :   Rationale: increase strength and improve coordination  to improve the patients ability to perform functional tasks with improved stability          With   [] TE   [] TA   [] neuro   [] other: Patient Education: [x] Review HEP    [] Progressed/Changed HEP based on:   [] positioning   [] body mechanics   [] transfers   [] heat/ice application    [] other:      Other Objective/Functional Measures: pt tolerates OKC interventions well, still reports some discomfort in left knee with CKC     Pain Level (0-10 scale) post treatment: 0    ASSESSMENT/Changes in Function: Pt with good tolerance to therex at this time, encouraged pt to be more consistent with HEP if those interventions seem to help. Continue to progress hip and quad strength progressing to CKC activities    Patient will continue to benefit from skilled PT services to modify and progress therapeutic interventions, address functional mobility deficits, address ROM deficits, address strength deficits, analyze and address soft tissue restrictions, analyze and cue movement patterns and analyze and modify body mechanics/ergonomics to attain remaining goals. []  See Plan of Care  []  See progress note/recertification  []  See Discharge Summary         Progress towards goals / Updated goals:  Short Term Goals: To be accomplished in 2 weeks:                         0. Patient will report performance of HEP at least 2 times per day to facilitate improved outcomes and improved self management. Pt reports only partial compliance although helpful when she performs 10/11/18      Long Term Goals: To be accomplished in 4 weeks:                         1. Patient will report FOTO score of 72 or better to indicate significant improvement in functional status.                          3. Pt will demonstrate L knee AROM 0 - 125 or better to improve ease of functional mobility.                        1. Pt will demonstrate left knee strength of 5/5 or better to improve ease of ADLs.                        5. Pt will demonstrate ability to reciprocally negotiate stairs void of pain exacerbation to improve community mobility.     PLAN  []  Upgrade activities as tolerated     []  Continue plan of care  []  Update interventions per flow sheet       []  Discharge due to:_  []  Other:_      Marcelo Gregory DPT, CMTPT 10/11/2018  7:35 AM    Future Appointments  Date Time Provider Jono Cappsi   10/17/2018 2:45 PM Kenji House MD [facility] [address]   10/17/2018 4:30 PM Alf Jones PT [facility]   10/19/2018 3:00 PM [address] [place] [address]   1/28/2019 8:00 AM Kenji House MD [facility].

## 2018-10-17 ENCOUNTER — HOSPITAL ENCOUNTER (OUTPATIENT)
Dept: PHYSICAL THERAPY | Age: 43
Discharge: HOME | End: 2018-10-17
Payer: OTHER GOVERNMENT

## 2018-10-17 ENCOUNTER — OFFICE VISIT (OUTPATIENT)
Dept: FAMILY MEDICINE CLINIC | Age: 43
End: 2018-10-17

## 2018-10-17 VITALS
WEIGHT: 186 LBS | HEIGHT: 65 IN | RESPIRATION RATE: 14 BRPM | SYSTOLIC BLOOD PRESSURE: 102 MMHG | DIASTOLIC BLOOD PRESSURE: 68 MMHG | HEART RATE: 81 BPM | OXYGEN SATURATION: 97 % | TEMPERATURE: 97.7 F | BODY MASS INDEX: 30.99 KG/M2

## 2018-10-17 DIAGNOSIS — M17.10 ARTHRITIS OF KNEE: Primary | ICD-10-CM

## 2018-10-17 DIAGNOSIS — Z23 ENCOUNTER FOR IMMUNIZATION: ICD-10-CM

## 2018-10-17 DIAGNOSIS — E66.9 OBESITY, UNSPECIFIED CLASSIFICATION, UNSPECIFIED OBESITY TYPE, UNSPECIFIED WHETHER SERIOUS COMORBIDITY PRESENT: ICD-10-CM

## 2018-10-17 DIAGNOSIS — M22.2X2 PATELLOFEMORAL DISORDER OF BOTH KNEES: ICD-10-CM

## 2018-10-17 DIAGNOSIS — M22.2X1 PATELLOFEMORAL DISORDER OF BOTH KNEES: ICD-10-CM

## 2018-10-17 PROCEDURE — 97112 NEUROMUSCULAR REEDUCATION: CPT

## 2018-10-17 PROCEDURE — 97110 THERAPEUTIC EXERCISES: CPT

## 2018-10-17 PROCEDURE — 97016 VASOPNEUMATIC DEVICE THERAPY: CPT

## 2018-10-17 NOTE — PATIENT INSTRUCTIONS
Knee Arthritis: Care Instructions  Your Care Instructions    Knee arthritis is a breakdown of the cartilage that cushions your knee joint. When the cartilage wears down, your bones rub against each other. This causes pain and stiffness. Knee arthritis tends to get worse with time. Treatment for knee arthritis involves reducing pain, making the leg muscles stronger, and staying at a healthy body weight. The treatment usually does not improve the health of the cartilage, but it can reduce pain and improve how well your knee works. You can take simple measures to protect your knee joints, ease your pain, and help you stay active. Follow-up care is a key part of your treatment and safety. Be sure to make and go to all appointments, and call your doctor if you are having problems. It's also a good idea to know your test results and keep a list of the medicines you take. How can you care for yourself at home? · Know that knee arthritis will cause more pain on some days than on others. · Stay at a healthy weight. Lose weight if you are overweight. When you stand up, the pressure on your knees from every pound of body weight is multiplied four times. So if you lose 10 pounds, you will reduce the pressure on your knees by 40 pounds. · Talk to your doctor or physical therapist about exercises that will help ease joint pain. ? Stretch to help prevent stiffness and to prevent injury before you exercise. You may enjoy gentle forms of yoga to help keep your knee joints and muscles flexible. ? Walk instead of jog.  ? Ride a bike. This makes your thigh muscles stronger and takes pressure off your knee. ? Wear well-fitting and comfortable shoes. ? Exercise in chest-deep water. This can help you exercise longer with less pain. ? Avoid exercises that include squatting or kneeling. They can put a lot of strain on your knees.   ? Talk to your doctor to make sure that the exercise you do is not making the arthritis worse.  · Do not sit for long periods of time. Try to walk once in a while to keep your knee from getting stiff. · Ask your doctor or physical therapist whether shoe inserts may reduce your arthritis pain. · If you can afford it, get new athletic shoes at least every year. This can help reduce the strain on your knees. · Use a device to help you do everyday activities. ? A cane or walking stick can help you keep your balance when you walk. Hold the cane or walking stick in the hand opposite the painful knee. ? If you feel like you may fall when you walk, try using crutches or a front-wheeled walker. These can prevent falls that could cause more damage to your knee. ? A knee brace may help keep your knee stable and prevent pain. ? You also can use other things to make life easier, such as a higher toilet seat and handrails in the bathtub or shower. · Take pain medicines exactly as directed. ? Do not wait until you are in severe pain. You will get better results if you take it sooner. ? If you are not taking a prescription pain medicine, take an over-the-counter medicine such as acetaminophen (Tylenol), ibuprofen (Advil, Motrin), or naproxen (Aleve). Read and follow all instructions on the label. ? Do not take two or more pain medicines at the same time unless the doctor told you to. Many pain medicines have acetaminophen, which is Tylenol. Too much acetaminophen (Tylenol) can be harmful. ? Tell your doctor if you take a blood thinner, have diabetes, or have allergies to shellfish. · Ask your doctor if you might benefit from a shot of steroid medicine into your knee. This may provide pain relief for several months. · Many people take the supplements glucosamine and chondroitin for osteoarthritis. Some people feel they help, but the medical research does not show that they work. Talk to your doctor before you take these supplements. When should you call for help?   Call your doctor now or seek immediate medical care if:    · You have sudden swelling, warmth, or pain in your knee.     · You have knee pain and a fever or rash.     · You have such bad pain that you cannot use your knee.    Watch closely for changes in your health, and be sure to contact your doctor if you have any problems. Where can you learn more? Go to http://radha-roney.info/. Enter E202 in the search box to learn more about \"Knee Arthritis: Care Instructions. \"  Current as of: June 11, 2018  Content Version: 11.8  © 7704-4182 [employer]. Care instructions adapted under license by  (which disclaims liability or warranty for this information). If you have questions about a medical condition or this instruction, always ask your healthcare professional. [address] any warranty or liability for your use of this information. Follow up in 4 months for annual physical      Influenza (Flu) Vaccine (Inactivated or Recombinant): What You Need to Know  Why get vaccinated? Influenza (\"flu\") is a contagious disease that spreads around the [facility] every winter, usually between October and May. Flu is caused by influenza viruses and is spread mainly by coughing, sneezing, and close contact. Anyone can get flu. Flu strikes suddenly and can last several days. Symptoms vary by age, but can include:  · Fever/chills. · Sore throat. · Muscle aches. · Fatigue. · Cough. · Headache. · Runny or stuffy nose. Flu can also lead to pneumonia and blood infections, and cause diarrhea and seizures in children. If you have a medical condition, such as heart or lung disease, flu can make it worse. Flu is more dangerous for some people. Infants and young children, people 72years of age and older, pregnant women, and people with certain health conditions or a weakened immune system are at greatest risk.   Each year thousands of people in the [facility] die from flu, and many more are hospitalized. Flu vaccine can:  · Keep you from getting flu. · Make flu less severe if you do get it. · Keep you from spreading flu to your family and other people. Inactivated and recombinant flu vaccines  A dose of flu vaccine is recommended every flu season. Children 6 months through 6years of age may need two doses during the same flu season. Everyone else needs only one dose each flu season. Some inactivated flu vaccines contain a very small amount of a mercury-based preservative called thimerosal. Studies have not shown thimerosal in vaccines to be harmful, but flu vaccines that do not contain thimerosal are available. There is no live flu virus in flu shots. They cannot cause the flu. There are many flu viruses, and they are always changing. Each year a new flu vaccine is made to protect against three or four viruses that are likely to cause disease in the upcoming flu season. But even when the vaccine doesn't exactly match these viruses, it may still provide some protection. Flu vaccine cannot prevent:  · Flu that is caused by a virus not covered by the vaccine. · Illnesses that look like flu but are not. Some people should not get this vaccine  Tell the person who is giving you the vaccine:  · If you have any severe (life-threatening) allergies. If you ever had a life-threatening allergic reaction after a dose of flu vaccine, or have a severe allergy to any part of this vaccine, you may be advised not to get vaccinated. Most, but not all, types of flu vaccine contain a small amount of egg protein. · If you ever had Guillain-Barré syndrome (also called GBS) Some people with a history of GBS should not get this vaccine. This should be discussed with your doctor. · If you are not feeling well. It is usually okay to get flu vaccine when you have a mild illness, but you might be asked to come back when you feel better.   Risks of a vaccine reaction  With any medicine, including vaccines, there is a chance of reactions. These are usually mild and go away on their own, but serious reactions are also possible. Most people who get a flu shot do not have any problems with it. Minor problems following a flu shot include:  · Soreness, redness, or swelling where the shot was given  · Hoarseness  · Sore, red or itchy eyes  · Cough  · Fever  · Aches  · Headache  · Itching  · Fatigue  If these problems occur, they usually begin soon after the shot and last 1 or 2 days. More serious problems following a flu shot can include the following:  · There may be a small increased risk of Guillain-Barré Syndrome (GBS) after inactivated flu vaccine. This risk has been estimated at 1 or 2 additional cases per million people vaccinated. This is much lower than the risk of severe complications from flu, which can be prevented by flu vaccine. · [place] children who get the flu shot along with pneumococcal vaccine (PCV13) and/or DTaP vaccine at the same time might be slightly more likely to have a seizure caused by fever. Ask your doctor for more information. Tell your doctor if a child who is getting flu vaccine has ever had a seizure  Problems that could happen after any injected vaccine:  · People sometimes faint after a medical procedure, including vaccination. Sitting or lying down for about 15 minutes can help prevent fainting, and injuries caused by a fall. Tell your doctor if you feel dizzy, or have vision changes or ringing in the ears. · Some people get severe pain in the shoulder and have difficulty moving the arm where a shot was given. This happens very rarely. · Any medication can cause a severe allergic reaction. Such reactions from a vaccine are very rare, estimated at about 1 in a million doses, and would happen within a few minutes to a few hours after the vaccination. As with any medicine, there is a very remote chance of a vaccine causing a serious injury or death.   The safety of vaccines is always being monitored. For more information, visit: www.cdc.gov/vaccinesafety/. What if there is a serious reaction? What should I look for? · Look for anything that concerns you, such as signs of a severe allergic reaction, very high fever, or unusual behavior. Signs of a severe allergic reaction can include hives, swelling of the face and throat, difficulty breathing, a fast heartbeat, dizziness, and weakness - usually within a few minutes to a few hours after the vaccination. What should I do? · If you think it is a severe allergic reaction or other emergency that can't wait, call 9-1-1 and get the person to the nearest hospital. Otherwise, call your doctor. · Reactions should be reported to the \"Vaccine Adverse Event Reporting System\" (VAERS). Your doctor should file this report, or you can do it yourself through the VAERS website at www.vaers. [employer].gov, or by calling 2-267.863.2393. [employer] does not give medical advice. The National Vaccine Injury Compensation Program  The National Vaccine Injury Compensation Program (VICP) is a federal program that was created to compensate people who may have been injured by certain vaccines. Persons who believe they may have been injured by a vaccine can learn about the program and about filing a claim by calling 3-635.188.7695 or visiting the [address] [employer] website at www.[facility].gov/vaccinecompensation. There is a time limit to file a claim for compensation. How can I learn more? · Ask your healthcare provider. He or she can give you the vaccine package insert or suggest other sources of information. · Call your local or state health department. · Contact the Centers for Disease Control and Prevention (CDC):  ? Call 3-626.516.8915 (1-800-CDC-INFO) or  ? Visit CDC's website at www.cdc.gov/flu  Vaccine Information Statement  Inactivated Influenza Vaccine  8/7/2015)  42 CHEL Jean 224FF-45  Department of Health and Human Services  Centers for Disease Control and Prevention  Many Vaccine Information Statements are available in [place]ic and other languages. See www.immunize.org/vis. Muchas hojas de información sobre vacunas están disponibles en español y en otros idiomas. Visite www.immunize.org/vis. Care instructions adapted under license by [employer] (which disclaims liability or warranty for this information). If you have questions about a medical condition or this instruction, always ask your healthcare professional. [address] any warranty or liability for your use of this information.

## 2018-10-17 NOTE — PROGRESS NOTES
Chief Complaint   Patient presents with    Knee Pain     f/u knee pain and physical therapy     1. Have you been to the ER, urgent care clinic since your last visit? Hospitalized since your last visit? No    2. Have you seen or consulted any other health care providers outside of the [address] [place] [address] since your last visit? Include any pap smears or colon screening. No    Physician order obtained. Patient completed adult immunization consent form. Allergies, contraindications and recommendations reviewed with patient. Seasonal influenza vaccine administered IM right deltoid. Patient tolerated well. Patient remained in office for 15 minutes after injection and no adverse reactions were noted.

## 2018-10-17 NOTE — PROGRESS NOTES
PT DAILY TREATMENT NOTE     Patient Name: Chay Mandel  Date:10/17/2018  : 1975  [x]  Patient  Verified  Payor:  / Plan: [facility] / Product Type:  /    In time:4:30  Out time:5:23  Total Treatment Time (min): 53  Visit #: 4 of 8    Treatment Area: Patellofemoral disorders, left knee [M22.2X2]    SUBJECTIVE  Pain Level (0-10 scale): 0-1  Any medication changes, allergies to medications, adverse drug reactions, diagnosis change, or new procedure performed?: [x] No    [] Yes (see summary sheet for update)  Subjective functional status/changes:   [] No changes reported  Pt reports x-ray reveal OA per MD f/u.     OBJECTIVE    Modality rationale: decrease inflammation and decrease pain to improve the patients ability to perform ADLs   Min Type Additional Details    [] Estim:  []Unatt       []IFC  []Premod                        []Other:  []w/ice   []w/heat  Position:  Location:    [] Estim: []Att    []TENS instruct  []NMES                    []Other:  []w/US   []w/ice   []w/heat  Position:  Location:    []  Traction: [] Cervical       []Lumbar                       [] Prone          []Supine                       []Intermittent   []Continuous Lbs:  [] before manual  [] after manual    []  Ultrasound: []Continuous   [] Pulsed                           []1MHz   []3MHz W/cm2:  Location:    []  Iontophoresis with dexamethasone         Location: [] Take home patch   [] In clinic    []  Ice     []  heat  []  Ice massage  []  Laser   []  Anodyne Position:  Location:    []  Laser with stim  []  Other:  Position:  Location:   10 [x]  Vasopneumatic Device Pressure:       [] lo [x] med [] hi   Temperature: [x] lo [] med [] hi   [] Skin assessment post-treatment:  []intact []redness- no adverse reaction    []redness - adverse reaction:       33 min Therapeutic Exercise:  [x] See flow sheet :   Rationale: increase ROM and increase strength to improve the patients ability to perform daily tasks and ADLs    10 min Neuromuscular Re-education:  [x]  See flow sheet :   Rationale: increase strength and improve coordination  to improve the patients ability to perform functional tasks with improved stability          With   [] TE   [] TA   [] neuro   [] other: Patient Education: [x] Review HEP    [] Progressed/Changed HEP based on:   [] positioning   [] body mechanics   [] transfers   [] heat/ice application    [] other:      Other Objective/Functional Measures: vcing with hip abd to maintain neutral and avoid hip flexion     Pain Level (0-10 scale) post treatment: 0    ASSESSMENT/Changes in Function: Pt with good effort with treatment and tolerated increase in therex well. Pt will benefit from continued progression with therex as able. Patient will continue to benefit from skilled PT services to modify and progress therapeutic interventions, address functional mobility deficits, address ROM deficits, address strength deficits, analyze and address soft tissue restrictions, analyze and cue movement patterns and analyze and modify body mechanics/ergonomics to attain remaining goals. []  See Plan of Care  []  See progress note/recertification  []  See Discharge Summary         Progress towards goals / Updated goals:  Short Term Goals: To be accomplished in 2 weeks:                         4. Patient will report performance of HEP at least 2 times per day to facilitate improved outcomes and improved self management. Pt reports only partial compliance although helpful when she performs 10/11/18      Long Term Goals: To be accomplished in 4 weeks:                         1. Patient will report FOTO score of 72 or better to indicate significant improvement in functional status.                        1. Pt will demonstrate L knee AROM 0 - 125 or better to improve ease of functional mobility.  - progressing 10-17-18                         3. Pt will demonstrate left knee strength of 5/5 or better to improve ease of ADLs.                        8. Pt will demonstrate ability to reciprocally negotiate stairs void of pain exacerbation to improve community mobility.     PLAN  [x]  Upgrade activities as tolerated     []  Continue plan of care  []  Update interventions per flow sheet       []  Discharge due to:_  []  Other:_      Mari Guerra, PT  10/17/2018  5:35 PM    Future Appointments   Date Time Provider Jono Camejo   10/19/2018  3:00 PM Jonna ELENA   1/28/2019  8:00 AM Nga Melo MD [facility] [address]

## 2018-10-19 ENCOUNTER — HOSPITAL ENCOUNTER (OUTPATIENT)
Dept: PHYSICAL THERAPY | Age: 43
Discharge: HOME | End: 2018-10-19
Payer: OTHER GOVERNMENT

## 2018-10-19 PROCEDURE — 97112 NEUROMUSCULAR REEDUCATION: CPT

## 2018-10-19 PROCEDURE — 97110 THERAPEUTIC EXERCISES: CPT

## 2018-10-19 NOTE — PROGRESS NOTES
PT DAILY TREATMENT NOTE     Patient Name: Keturah Zambrano  Date:10/19/2018  : 1975  [x]  Patient  Verified  Payor: ALFA / Plan: Yvonne Gonzalez / Product Type:  /    In time:3:13pm  Out time:4:00pm  Total Treatment Time (min): 52  Visit #: 5 of 8    Treatment Area: Patellofemoral disorders, left knee [M22.2X2]    SUBJECTIVE  Pain Level (0-10 scale): 0  Any medication changes, allergies to medications, adverse drug reactions, diagnosis change, or new procedure performed?: [x] No    [] Yes (see summary sheet for update)  Subjective functional status/changes:   [] No changes reported  \"It's better, I just still have trouble going up and down stairs and getting up out of a chair. OBJECTIVE    24 min Therapeutic Exercise:  [x] See flow sheet :   Rationale: increase ROM and increase strength to improve the patients ability to improve ease of ADLs. 23 min Neuromuscular Re-education:  [x]  See flow sheet :   Rationale: increase strength and improve coordination  to improve the patients ability to improve ease of daily activity. With   [] TE   [] TA   [] neuro   [] other: Patient Education: [x] Review HEP    [] Progressed/Changed HEP based on:   [] positioning   [] body mechanics   [] transfers   [] heat/ice application    [] other:      Other Objective/Functional Measures: knee strength: 4+/5 knee extension Left, 5/5 Right    Knee AROM/PROM: Right 0-121/131 ant knee pain left 0-120/129 ant knee pain    Pain Level (0-10 scale) post treatment: 0    ASSESSMENT/Changes in Function: Pt demonstrates some improved knee ROM and strength, but continues to demonstrate pain limited left knee extension strength. She reports a decline in knee pain, but remains limited with stair negotiation and standing from sitting 2/2 pain. Continue PT.     Patient will continue to benefit from skilled PT services to modify and progress therapeutic interventions, address functional mobility deficits, address ROM deficits, address strength deficits, analyze and address soft tissue restrictions, analyze and cue movement patterns and analyze and modify body mechanics/ergonomics to attain remaining goals. []  See Plan of Care  []  See progress note/recertification  []  See Discharge Summary         Progress towards goals / Updated goals:  Short Term Goals: To be accomplished in 2 weeks:                         3. Patient will report performance of HEP at least 2 times per day to facilitate improved outcomes and improved self management. Pt reports only partial compliance although helpful when she performs 10/11/18      Long Term Goals: To be accomplished in 4 weeks:                         1. Patient will report FOTO score of 72 or better to indicate significant improvement in functional status. TC, complete NV 10/19/2018                         2. Pt will demonstrate L knee AROM 0 - 125 or better to improve ease of functional mobility. - progressing 10-17-18. Progressing L knee 0-120 AROM/ 0 - 129 PROM with end range pain. 10/19/2018                         3. Pt will demonstrate left knee strength of 5/5 or better to improve ease of ADLs. Not met, 4+/5 with pain 10/19/2018                         4. Pt will demonstrate ability to reciprocally negotiate stairs void of pain exacerbation to improve community mobility. Not met per pt report 10/19/2018        PLAN  []  Upgrade activities as tolerated     []  Continue plan of care  []  Update interventions per flow sheet       []  Discharge due to:_  [x]  Other:_Continue 2x3-4 weeks, progress with OKC to CKC strengthening as tolerated, consider ionto with dexa PRN if able.       Julianne Polk, PT, DPT, ATC 10/19/2018  3:12 PM    Future Appointments   Date Time Provider Jono Camejo   1/28/2019  8:00 AM Edilberto Loera MD [facility]

## 2018-10-19 NOTE — PROGRESS NOTES
[facility]  [address]  [place], [address]  [phone] fax    Physical Therapy Progress Note  Patient name: Romario Peoples Start of Care: 2018   Referral source: Antwan Serrano MD : 1975   Medical/Treatment Diagnosis: Patellofemoral disorders, left knee [M22.2X2] Onset Date:several months ago   Prior Hospitalization: see medical history Provider#: [id number]   Medications: Verified on Patient Summary List     Comorbidities: unremarkable   Prior Level of Function: sedentary, some limited walking for exercise void of knee pain limitations      Visits from Start of Care: 5    Missed Visits: 0    Established Goals:        Excellent         Good         Limited            None  [] Increased ROM   []  []  [x]  []  [] Increased Strength  []  []  [x]  []  [] Increased Mobility  []  []  []  []   [] Decreased Pain   []  []  [x]  []  [] Decreased Swelling  []  []  []  []    Key Functional Changes:     knee strength: 4+/5 knee extension Left, 5/5 Right     Knee AROM/PROM: Right 0-121/131 ant knee pain left 0-120/129 ant knee pain     Updated Goals: to be achieved in 3 weeks:                         5. Patient will report FOTO score of 72 or better to indicate significant improvement in functional status. TC, complete NV 10/19/2018                         2. Pt will demonstrate L knee AROM 0 - 125 or better to improve ease of functional mobility. - progressing 10-17-18. Progressing L knee 0-120 AROM/ 0 - 129 PROM with end range pain. 10/19/2018                         3. Pt will demonstrate left knee strength of 5/5 or better to improve ease of ADLs. Not met, 4+/5 with pain 10/19/2018                         4. Pt will demonstrate ability to reciprocally negotiate stairs void of pain exacerbation to improve community mobility.  Not met per pt report 10/19/2018        ASSESSMENT/RECOMMENDATIONS: Pt demonstrates some improved knee ROM and strength, but continues to demonstrate pain limited left knee extension strength. She reports a decline in knee pain, but remains limited with stair negotiation and standing from sitting 2/2 pain. She has been limited 2/2 some limited attendance in PT. Continue PT.    [x]Continue therapy per initial plan/protocol at a frequency of  2 x per week for 3 weeks  []Continue therapy with the following recommended changes:_____________________      _____________________________________________________________________  []Discontinue therapy progressing towards or have reached established goals  []Discontinue therapy due to lack of appreciable progress towards goals  []Discontinue therapy due to lack of attendance or compliance  []Await Physician's recommendations/decisions regarding therapy  []Other:________________________________________________________________    Thank you for this referral.    Gt Ugarte, PT, DPT, ATC 10/19/2018 6:32 PM  NOTE TO PHYSICIAN:  PLEASE COMPLETE THE ORDERS BELOW AND   FAX TO [facility]: [phone]  If you are unable to process this request in 24 hours please contact our office: [phone] I have read the above report and request that my patient continue as recommended. ? I have read the above report and request that my patient continue therapy with the following changes/special instructions:__________________________________________________________  ? I have read the above report and request that my patient be discharged from therapy.     Physicians signature: ______________________________Date: ______Time:______

## 2018-10-23 ENCOUNTER — HOSPITAL ENCOUNTER (OUTPATIENT)
Dept: PHYSICAL THERAPY | Age: 43
Discharge: HOME | End: 2018-10-23
Payer: OTHER GOVERNMENT

## 2018-10-23 PROCEDURE — 97110 THERAPEUTIC EXERCISES: CPT

## 2018-10-23 PROCEDURE — 97112 NEUROMUSCULAR REEDUCATION: CPT

## 2018-10-23 NOTE — PROGRESS NOTES
PT DAILY TREATMENT NOTE 1216    Patient Name: Jerson Aguirre  Date:10/23/2018  : 1975  [x]  Patient  Verified  Payor: ALFA / Plan: Dewayne Angulo 73 / Product Type: Lasha Mount /    In time:8:33  Out time:9:19  Total Treatment Time (min): 46  Visit #: 1 of 6    Treatment Area: Patellofemoral disorders, left knee [M22.2X2]    SUBJECTIVE  Pain Level (0-10 scale): 0/10  Any medication changes, allergies to medications, adverse drug reactions, diagnosis change, or new procedure performed?: [x] No    [] Yes (see summary sheet for update)  Subjective functional status/changes:   [] No changes reported  \"I can actually walk up and down stairs now, I can still feel it. .. The mobility is much better. \"    OBJECTIVE  34 min Therapeutic Exercise:  [x] See flow sheet :   Rationale: increase ROM and increase strength to improve the patients ability to improve ADL ease. 12 min Neuromuscular Re-education:  [x]  See flow sheet :   Rationale: increase ROM and increase strength  to improve the patients ability to improve ADL ease. With   [] TE   [] TA   [] neuro   [] other: Patient Education: [x] Review HEP    [] Progressed/Changed HEP based on:   [] positioning   [] body mechanics   [] transfers   [] heat/ice application    [] other:      Other Objective/Functional Measures: FOTO: 57      Cues required to reduce valgus during closed chain exercise. Pain Level (0-10 scale) post treatment: 2-3/10 left knee    ASSESSMENT/Changes in Function: The patient requested if elliptical would be a suitable form of exercise for her. PT indicated that as her strength improves and symptoms reduce, she can add the elliptical back in. PT does not have restrictions for the patient, but did indicate to not push through symptoms if she should have them while participating in activity.     Patient will continue to benefit from skilled PT services to modify and progress therapeutic interventions, address functional mobility deficits, address ROM deficits, address strength deficits, analyze and address soft tissue restrictions, analyze and cue movement patterns, analyze and modify body mechanics/ergonomics, assess and modify postural abnormalities and instruct in home and community integration to attain remaining goals. []  See Plan of Care  []  See progress note/recertification  []  See Discharge Summary         Progress towards goals / Updated goals:                         6. Patient will report FOTO score of 72 or better to indicate significant improvement in functional status. TC, complete NV 10/19/2018                         2. Pt will demonstrate L knee AROM 0 - 125 or better to improve ease of functional mobility. - progressing 10-17-18.  Progressing L knee 0-120 AROM/ 0 - 129 PROM with end range pain.  10/19/2018                         3. Pt will demonstrate left knee strength of 5/5 or better to improve ease of ADLs. Not met, 4+/5 with pain 10/19/2018                         4. Pt will demonstrate ability to reciprocally negotiate stairs void of pain exacerbation to improve community mobility. Not met per pt report 10/19/2018     PLAN  []  Upgrade activities as tolerated     [x]  Continue plan of care  []  Update interventions per flow sheet       []  Discharge due to:_  []  Other:_      Court Rhiannon, PT 10/23/2018  8:56 AM    Future Appointments   Date Time Provider Jono Camejo   10/29/2018  5:30 PM Ayaan Townsend, PT [facility]   11/1/2018  7:30 AM Devin Swain [facility]   11/6/2018  7:00 AM Jonnie Pozo, PT [facility]   11/9/2018  5:00 PM [place], [address] [facility]   11/16/2018  4:00 PM [place], [address] [facility]   1/28/2019  8:00 AM Colette Ahumada, MD FP [address]

## 2018-10-29 ENCOUNTER — HOSPITAL ENCOUNTER (OUTPATIENT)
Dept: PHYSICAL THERAPY | Age: 43
Discharge: HOME | End: 2018-10-29
Payer: OTHER GOVERNMENT

## 2018-10-29 PROCEDURE — 97110 THERAPEUTIC EXERCISES: CPT

## 2018-10-29 PROCEDURE — 97112 NEUROMUSCULAR REEDUCATION: CPT

## 2018-10-29 NOTE — PROGRESS NOTES
PT DAILY TREATMENT NOTE 10-18    Patient Name: Christ Mitchell  Date:10/29/2018  : 1975  [x]  Patient  Verified  Payor:  / Plan: Samantha Bruno / Product Type:  /    In time:535  Out time:610  Total Treatment Time (min): 35  Visit #: 2 of 6  Treatment Area: Patellofemoral disorders, left knee [M22.2X2]    SUBJECTIVE  Pain Level (0-10 scale): 0  Any medication changes, allergies to medications, adverse drug reactions, diagnosis change, or new procedure performed?: [x] No    [] Yes (see summary sheet for update)  Subjective functional status/changes:   [] No changes reported  No new complaints    OBJECTIVE    23 min Neuromuscular Re-education:  [x]  See flow sheet :   Rationale: increase strength, improve coordination and increase proprioception  to improve the patients ability to improve LE biomechanics with weight bearing activity to eliminate pain with ADLs   12 min Therapeutic Exercise:  [x] See flow sheet :   Rationale: increase ROM and increase strength to improve the patients ability to increase ease of ADLs            With   [] TE   [] TA   [] neuro   [] other: Patient Education: [x] Review HEP    [] Progressed/Changed HEP based on:   [] positioning   [] body mechanics   [] transfers   [] heat/ice application    [] other:      Other Objective/Functional Measures: Added sidelying leg presses to improve LE alignment with closed chain knee flexion - great glut activation and improved proprioceptive awareness of foot/knee/hip alignment. Able to complete step ups void of pain     Pain Level (0-10 scale) post treatment: 0-1    ASSESSMENT/Changes in Function: Pt did well with new proprioceptive challenges and was able to improve LE positioning during treatment. Pt expressed interest in personal training as well as Pilates post rehab.       Patient will continue to benefit from skilled PT services to modify and progress therapeutic interventions, address functional mobility deficits, address ROM deficits, address strength deficits, analyze and address soft tissue restrictions, analyze and cue movement patterns, analyze and modify body mechanics/ergonomics and assess and modify postural abnormalities to attain remaining goals. []  See Plan of Care  []  See progress note/recertification  []  See Discharge Summary         Progress towards goals / Updated goals:                       2. Patient will report FOTO score of 72 or better to indicate significant improvement in functional status. TC, complete NV 10/19/2018                         2. Pt will demonstrate L knee AROM 0 - 125 or better to improve ease of functional mobility. - progressing 10-17-18.  Progressing L knee 0-120 AROM/ 0 - 129 PROM with end range pain.  10/19/2018                         3. Pt will demonstrate left knee strength of 5/5 or better to improve ease of ADLs. Not met, 4+/5 with pain 10/19/2018                         4. Pt will demonstrate ability to reciprocally negotiate stairs void of pain exacerbation to improve community mobility. Not met per pt report 10/19/2018; Progressing - able to ascend after NM retraining without valgus collapse and pain  10/29/2018           PLAN  []  Upgrade activities as tolerated     [x]  Continue plan of care  []  Update interventions per flow sheet       []  Discharge due to:_  []  Other:_      Elijah Perez, PT 10/29/2018  5:38 PM    Future Appointments   Date Time Provider Jono Camejo   11/1/2018  7:30 AM [place], [address] [facility]   11/6/2018  7:00 AM [facility]   11/9/2018  5:00 PM [place], [address] [facility]   11/16/2018  4:00 PM [place], [address] [facility]   1/28/2019  8:00 AM Jamie Beauchamp MD FP [facility]

## 2018-11-01 ENCOUNTER — APPOINTMENT (OUTPATIENT)
Dept: PHYSICAL THERAPY | Age: 43
End: 2018-11-01
Payer: OTHER GOVERNMENT

## 2018-11-06 ENCOUNTER — HOSPITAL ENCOUNTER (OUTPATIENT)
Dept: PHYSICAL THERAPY | Age: 43
Discharge: HOME | End: 2018-11-06
Payer: OTHER GOVERNMENT

## 2018-11-06 PROCEDURE — 97110 THERAPEUTIC EXERCISES: CPT

## 2018-11-06 PROCEDURE — 97112 NEUROMUSCULAR REEDUCATION: CPT

## 2018-11-06 NOTE — PROGRESS NOTES
PT DAILY TREATMENT NOTE 12    Patient Name: Jean Pierre Doe  Date:2018  : 1975  [x]  Patient  Verified  Payor: ALFA / Plan: Dewayne Angulo 73 / Product Type:  /    In time:7:02  Out time:7:50  Total Treatment Time (min): 48  Visit #: 3 of 6    Treatment Area: Patellofemoral disorders, left knee [M22.2X2]     SUBJECTIVE  Pain Level (0-10 scale): 0/10  Any medication changes, allergies to medications, adverse drug reactions, diagnosis change, or new procedure performed?: [x] No    [] Yes (see summary sheet for update)  Subjective functional status/changes:   [] No changes reported  The patient reports no pain upon arrival. Reports she was able to perform ellipitcal for 5 minutes outside of PT.     OBJECTIVE   Modality rationale: decrease edema, decrease inflammation and decrease pain to improve the patients ability to improve ADL ease. Min Type Additional Details   10 [x]  Ice     []  heat  []  Ice massage  []  Laser   []  Anodyne Position: supine with elevation  Location:  B knees    []  Laser with stim  []  Other: Position:  Location:    []  Vasopneumatic Device Pressure:       [] lo [] med [] hi   Temperature: [] lo [] med [] hi   [] Skin assessment post-treatment:  []intact []redness- no adverse reaction    []redness - adverse reaction:      26 min Therapeutic Exercise:  [x] See flow sheet :   Rationale: increase ROM and increase strength to improve the patients ability to improve ADL ease. 12 min Neuromuscular Re-education:  [x]  See flow sheet : Fig 8 squats, bandwalks   Rationale: improve coordination, improve balance and increase proprioception  to improve the patients ability to improve ADL ease. With   [] TE   [] TA   [] neuro   [] other: Patient Education: [x] Review HEP    [] Progressed/Changed HEP based on:   [] positioning   [] body mechanics   [] transfers   [] heat/ice application    [] other:      Other Objective/Functional Measures:    Added figure 8 KB exercise with fairly good tolerance and form. Progressed to 4# dynamic step ups. Quad strength: 5/5 MMT bilaterally    Pain Level (0-10 scale) post treatment: 0/10    ASSESSMENT/Changes in Function: Excellent progress with PT up to this point. The patient, again, expressed interest in Pilates program post treatment with questions answered. Continue to progress closed chain strengthening and return to activity. Patient will continue to benefit from skilled PT services to modify and progress therapeutic interventions, address functional mobility deficits, address ROM deficits, address strength deficits, analyze and address soft tissue restrictions, analyze and cue movement patterns, analyze and modify body mechanics/ergonomics, assess and modify postural abnormalities and instruct in home and community integration to attain remaining goals. []  See Plan of Care  []  See progress note/recertification  []  See Discharge Summary         Progress towards goals / Updated goals:                       3. Patient will report FOTO score of 72 or better to indicate significant improvement in functional status. TC, complete NV 10/19/2018                         2. Pt will demonstrate L knee AROM 0 - 125 or better to improve ease of functional mobility. - progressing 10-17-18.  Progressing L knee 0-120 AROM/ 0 - 129 PROM with end range pain.  10/19/2018                         3. Pt will demonstrate left knee strength of 5/5 or better to improve ease of ADLs. Not met, 4+/5 with pain 10/19/2018; Met 5/5 MMT 11/06/2018                         4. Pt will demonstrate ability to reciprocally negotiate stairs void of pain exacerbation to improve community mobility. Not met per pt report 10/19/2018; Progressing - able to ascend after NM retraining without valgus collapse and pain  10/29/2018     PLAN  []  Upgrade activities as tolerated     [x]  Continue plan of care  []  Update interventions per flow sheet []  Discharge due to:_  []  Other:_      Conor Miller, PT 11/6/2018  7:07 AM    Future Appointments   Date Time Provider Jono Nell   11/9/2018  5:00 PM [facility]   11/16/2018  4:00 PM [place], [address] [facility]   1/28/2019  8:00 AM Caesar Vargas MD \[facility]\"" [address]

## 2018-11-09 ENCOUNTER — APPOINTMENT (OUTPATIENT)
Dept: PHYSICAL THERAPY | Age: 43
End: 2018-11-09
Payer: OTHER GOVERNMENT

## 2018-11-16 ENCOUNTER — HOSPITAL ENCOUNTER (OUTPATIENT)
Dept: PHYSICAL THERAPY | Age: 43
Discharge: HOME | End: 2018-11-16
Payer: OTHER GOVERNMENT

## 2018-11-16 PROCEDURE — 97110 THERAPEUTIC EXERCISES: CPT

## 2018-11-16 PROCEDURE — 97112 NEUROMUSCULAR REEDUCATION: CPT

## 2018-11-16 NOTE — PROGRESS NOTES
[facility]  [address]  [place], [address]  [phone] fax    Physical Therapy Discharge Summary  atient name: Vani Braun Start of Care: 2018   Referral source: Terry Jimenez MD : 1975   Medical/Treatment Diagnosis: Patellofemoral disorders, left knee [M22.2X2] Onset Date:several months ago   Prior Hospitalization: see medical history Provider#: [id number]   Medications: Verified on Patient Summary List      Comorbidities: unremarkable   Prior Level of Function: sedentary, some limited walking for exercise void of knee pain limitations       Visits from Start of Care: 9    Missed Visits: 5  Reporting Period : 18 to 18      Summary of Care:    Pt has progressed well with PT and is now ready for discharge. She plans to continue with HEP and possible Pilates program.      Progress towards goals:                       1. Patient will report FOTO score of 72 or better to indicate significant improvement in functional status. MET on 18                         2. Pt will demonstrate L knee AROM 0 - 125 or better to improve ease of functional mobility. - goal met 18                         3. Pt will demonstrate left knee strength of 5/5 or better to improve ease of ADLs.   Met 5/5 MMT 2018                         4. Pt will demonstrate ability to reciprocally negotiate stairs void of pain exacerbation to improve community mobility. met      ASSESSMENT/RECOMMENDATIONS:  [x]Discontinue therapy: [x]Patient has reached or is progressing toward set goals      []Patient is non-compliant or has abdicated      []Due to lack of appreciable progress towards set [address], PT 2018 4:27 PM

## 2018-11-16 NOTE — PROGRESS NOTES
PT DAILY TREATMENT NOTE     Patient Name: Marylen Seed  Date:2018  : 1975  [x]  Patient  Verified  Payor:  / Plan: [facility]  [facility] / Product Type:  /    In time:4:03  Out time:4:40  Total Treatment Time (min):37   Visit #: 4 of 6    Treatment Area: Patellofemoral disorders, left knee [M22.2X2]     SUBJECTIVE  Pain Level (0-10 scale): 0/10  Any medication changes, allergies to medications, adverse drug reactions, diagnosis change, or new procedure performed?: [x] No    [] Yes (see summary sheet for update)  Subjective functional status/changes:   [] No changes reported  No complaints. Pt reports performing the Elliptical this morning. OBJECTIVE      25 min Therapeutic Exercise:  [x] See flow sheet :   Rationale: increase ROM and increase strength to improve the patients ability to improve ADL ease. 12 min Neuromuscular Re-education:  [x]  See flow sheet:   Rationale: improve coordination, improve balance and increase proprioception  to improve the patients ability to improve ADL ease. With   [] TE   [] TA   [] neuro   [] other: Patient Education: [x] Review HEP    [] Progressed/Changed HEP based on:   [] positioning   [] body mechanics   [] transfers   [] heat/ice application    [] other:      Other Objective/Functional Measures: FOTO: 72   Pain Level (0-10 scale) post treatment: 0/10    ASSESSMENT/Changes in Function:  Pt has progressed well with PT and is now ready for discharge. She plans to continue with HEP and possible Pilates program.      []  See Plan of Care  []  See progress note/recertification   [x]  See Discharge Summary         Progress towards goals / Updated goals:                       3. Patient will report FOTO score of 72 or better to indicate significant improvement in functional status.  MET on 18                         2. Pt will demonstrate L knee AROM 0 - 125 or better to improve ease of functional mobility. - goal met 11-16-18                         3. Pt will demonstrate left knee strength of 5/5 or better to improve ease of ADLs.   Met 5/5 MMT 11/06/2018                         4. Pt will demonstrate ability to reciprocally negotiate stairs void of pain exacerbation to improve community mobility. met     PLAN  []  Upgrade activities as tolerated     []  Continue plan of care  []  Update interventions per flow sheet       [x]  Discharge due to:_  []  Other:_      Brendan Cardenas PT 11/16/2018  4:07 PM    Future Appointments   Date Time Provider Jono Camejo   1/28/2019  8:00 AM Hong Pisano MD FP [address]

## 2019-05-01 ENCOUNTER — OFFICE VISIT (OUTPATIENT)
Dept: FAMILY MEDICINE CLINIC | Age: 44
End: 2019-05-01

## 2019-05-01 VITALS
HEART RATE: 75 BPM | TEMPERATURE: 97.8 F | RESPIRATION RATE: 14 BRPM | BODY MASS INDEX: 31.65 KG/M2 | OXYGEN SATURATION: 99 % | SYSTOLIC BLOOD PRESSURE: 120 MMHG | DIASTOLIC BLOOD PRESSURE: 84 MMHG | HEIGHT: 65 IN | WEIGHT: 190 LBS

## 2019-05-01 DIAGNOSIS — H61.23 BILATERAL IMPACTED CERUMEN: Primary | ICD-10-CM

## 2019-05-01 NOTE — PROGRESS NOTES
1. Have you been to the ER, urgent care clinic since your last visit? Hospitalized since your last visit? No    2. Have you seen or consulted any other health care providers outside of the [address] [place] [address] since your last visit? Include any pap smears or colon screening.  No

## 2019-05-01 NOTE — PATIENT INSTRUCTIONS
Earwax Blockage: Care Instructions  Your Care Instructions    Earwax is a natural substance that protects the ear canal. Normally, earwax drains from the ears and does not cause problems. Sometimes earwax builds up and hardens. Earwax blockage (also called cerumen impaction) can cause some loss of hearing and pain. When wax is tightly packed, you will need to have your doctor remove it. Follow-up care is a key part of your treatment and safety. Be sure to make and go to all appointments, and call your doctor if you are having problems. It's also a good idea to know your test results and keep a list of the medicines you take. How can you care for yourself at home? · Do not try to remove earwax with cotton swabs, fingers, or other objects. This can make the blockage worse and damage the eardrum. · If your doctor recommends that you try to remove earwax at home:  ? Soften and loosen the earwax with warm mineral oil. You also can try hydrogen peroxide mixed with an equal amount of room temperature water. Place 5 drops of the fluid, warmed to body temperature, in the ear two times a day for up to 5 days. ? Once the wax is loose and soft, all that is usually needed to remove it from the ear canal is a gentle, warm shower. Direct the water into the ear, then tip your head to let the earwax drain out. Dry your ear thoroughly with a hair dryer set on low. Hold the dryer several inches from your ear. ? If the warm mineral oil and shower do not work, use an over-the-counter wax softener. Read and follow all instructions on the label. After using the wax softener, use an ear syringe to gently flush the ear. Make sure the flushing solution is body temperature. Cool or hot fluids in the ear can cause dizziness. When should you call for help? Call your doctor now or seek immediate medical care if:    · Pus or blood drains from your ear.     · Your ears are ringing or feel full.     · You have a loss of hearing.  Watch closely for changes in your health, and be sure to contact your doctor if:    · You have pain or reduced hearing after 1 week of home treatment.     · You have any new symptoms, such as nausea or balance problems. Where can you learn more? Go to http://radha-roney.info/. Enter C120 in the search box to learn more about \"Earwax Blockage: Care Instructions. \"  Current as of: September 23, 2018  Content Version: 11.9  © 7800-6069 [employer], [employer]. Care instructions adapted under license by [employer] (which disclaims liability or warranty for this information). If you have questions about a medical condition or this instruction, always ask your healthcare professional. [address] any warranty or liability for your use of this information.

## 2019-05-01 NOTE — PROGRESS NOTES
SUBJECTIVE   Chief Complaint   Patient presents with    Wax in Ear     The patient presents complaining of decreased hearing. Thinks she has wax buildup. No pain. No relief with the use once of an OTC ear wax drop. Tried q-tips and got some wax out. OBJECTIVE   Blood pressure 120/84, pulse 75, temperature 97.8 °F (36.6 °C), temperature source Oral, resp. rate 14, height 5' 5\" (1.651 m), weight 190 lb (86.2 kg), last menstrual period 04/27/2019, SpO2 99 %. General: Alert, cooperative, well appearing, in no apparent distress. Head: Head is symmetric, normocephalic without evidence of trauma or deformity. ENT: The external auditory canals are without swelling or drainage. The right ear canal is fully occluded by cerumen. The left is partially occluded. Skin: no rashes, no jaundice. Psych: normal affect. Mood good. Oriented x 3. Judgement and insight intact. ASSESSMENT / PLAN       ICD-10-CM ICD-9-CM    1. Bilateral impacted cerumen H61.23 380. 4      The patient was counseled on the appropriate usage of home remedies per cerumen impaction handout. If not improving, refer to ENT. 10 minutes of face to face time spent with the patient with at least 50% on counseling on above medical issues. All chart history elements were reviewed by me at the time of the visit even though marked at time of note closure. Patient understands our medical plan. Patient has provided input and agrees with goals. Alternatives have been explained and offered. All questions answered. The patient is to call if condition worsens or fails to improve. Follow-up and Dispositions    · Return as needed. She did not schedule her Feb CPE so we are not forcing the issue.   However I emphasized to her how important it is to see her OB/GYN for annual [facility] for mammography,etc.

## 2020-08-14 ENCOUNTER — HOSPITAL ENCOUNTER (OUTPATIENT)
Dept: LAB | Age: 45
Discharge: HOME | End: 2020-08-14
Payer: OTHER GOVERNMENT

## 2020-08-14 ENCOUNTER — OFFICE VISIT (OUTPATIENT)
Dept: FAMILY MEDICINE CLINIC | Facility: CLINIC | Age: 45
End: 2020-08-14

## 2020-08-14 VITALS
RESPIRATION RATE: 18 BRPM | TEMPERATURE: 99.6 F | DIASTOLIC BLOOD PRESSURE: 75 MMHG | OXYGEN SATURATION: 98 % | HEART RATE: 112 BPM | SYSTOLIC BLOOD PRESSURE: 107 MMHG

## 2020-08-14 DIAGNOSIS — R00.0 TACHYCARDIA: ICD-10-CM

## 2020-08-14 DIAGNOSIS — R53.83 FATIGUE, UNSPECIFIED TYPE: ICD-10-CM

## 2020-08-14 DIAGNOSIS — R50.9 FEVER, UNSPECIFIED FEVER CAUSE: ICD-10-CM

## 2020-08-14 DIAGNOSIS — R50.9 FEVER, UNSPECIFIED FEVER CAUSE: Primary | ICD-10-CM

## 2020-08-14 DIAGNOSIS — J02.9 SORE THROAT: ICD-10-CM

## 2020-08-14 LAB
S PYO AG THROAT QL: NEGATIVE
VALID INTERNAL CONTROL?: YES

## 2020-08-14 PROCEDURE — 87635 SARS-COV-2 COVID-19 AMP PRB: CPT

## 2020-08-14 NOTE — PROGRESS NOTES
Nafisa Carrion presents today for   Chief Complaint   Patient presents with    Sore Throat     yesterday    Chills     yesterday    Fever     101.4 this morning    Generalized Body Aches     yesterday    Fatigue     yesterday    Headache     yesterday       Is someone accompanying this pt? no    Is the patient using any DME equipment during OV? no    Travel and Exposure Screening was performed during check in or rooming process Yes  No      Depression Screening:  3 most recent PHQ Screens 6/18/2018   PHQ Not Done Patient Decline   Little interest or pleasure in doing things -   Feeling down, depressed, irritable, or hopeless -   Total Score PHQ 2 -       Fall Risk  No flowsheet data found.     This Visit Test  Results for orders placed or performed in visit on 08/14/20   AMB POC RAPID STREP A   Result Value Ref Range    VALID INTERNAL CONTROL POC Yes     Group A Strep Ag Negative Negative

## 2020-08-14 NOTE — LETTER
NOTIFICATION RETURN TO WORK / SCHOOL 
 
8/14/2020 11:12 AM 
 
Ms. Jennifer Luther [address] [id number] To Whom It May Concern: 
 
Jennifer Luther is currently under the care of [address] and needs to be quarantine until negative Covid results. She will return to work/school on: 8/20/20 If there are questions or concerns please have the patient contact our office. Sincerely, Jonny Campa DNP

## 2020-08-14 NOTE — PROGRESS NOTES
SUBJECTIVE:  Chief Complaint   Patient presents with    Sore Throat     yesterday    Chills     yesterday    Fever     101.4 this morning    Generalized Body Aches     yesterday    Fatigue     yesterday    Headache     yesterday     Patient states she has taken Tylenol and Motrin for her fevers. She took Tylenol this morning. Patient denies recent travel. Pt exposed to sick contacts under investigations for possible Covid NO. Patient works for [employer] and she has been working in her office. Pt is not a current smoker. OBJECTIVE    Visit Vitals  /75 (BP 1 Location: Left arm, BP Patient Position: Sitting)   Pulse (!) 112   Temp 99.6 °F (37.6 °C) (Oral)   Resp 18   SpO2 98%      General:  well nourished, cooperative, pleasant and in no apparent distress. Sick but not toxic appearing. Eyes:   The lids are without swelling, lesions, or drainage. The conjunctiva is clear and noninjected. ENT:  ENT exam normal, no neck nodes or sinus tenderness and throat normal without erythema or exudate. Noted a \"torus\" formation to the hard palate. No redness, drainage. After brief research discovered that this was harmless. Patient states that she has had all of her life. Neck: normal and no adenopathy. Lungs/CV: clear to auscultation, no wheezes or rales and unlabored breathing. Heart: regular rhythm tachycardia. Skin:  No rashes, no jaundice. ASSESSMENT / PLAN     ICD-10-CM ICD-9-CM    1. Fever, unspecified fever cause  R50.9 780.60 NOVEL CORONAVIRUS (COVID-19)      AMB POC RAPID STREP A   2. Sore throat  J02.9 462 NOVEL CORONAVIRUS (COVID-19)      AMB POC RAPID STREP A   3. Tachycardia  R00.0 785.0    4. Fatigue, unspecified type  R53.83 780.79        Negative for strep. Based on CDC recommendations and limited testing supplies, only those patients who meet criteria will be tested for Covid.      High priority groups for testing   Symptomatic and/or Exposure /Test for Covid  Immunocompromised host (on prednisone, biological therapy, blood cancer, metastatic cancer or active chemotherapy)   [facility] worker in the home    Other high-risk group: o age >47   o Uncontrolled DM   o Uncontrolled HTN   o BMI >40, CKD/ESRD    Dialysis patients (patients going to HD units, not asymptomatic home HD/PD)    Anyone living in a congregate setting     Non High-risk patient category           Test for COVID-19   Asymptomatic, no known exposure  No    Asymptomatic, possible exposure  No    Asymptomatic, definite exposure  Provider discretion    Symptomatic, no known exposure  Yes    Symptomatic, + exposure  Yes      Patient does  meet criteria for Covid testing. COVID-19 testing was completed. Work note was given until [employer] are available. If Covid testing was completed and is negative, patient may return back to work despite quarantine originally set in place if applicable. Awaiting Covid 19 results at this time. Instructed pt on the importance of rest, fluid intake (with avoidance of red fluids), and vit C supplements. Instructed pt to check temp if possible and to take acetaminophen or NSAIDs if fevers are noted. Instructed patient to remember to wash hands, disinfect surroundings, and avoid touching face. Instructed pt to remain home and and self quarantine until Covid results are negative and all symptoms have improved or subsided. If they must leave home, wear a mask. Patient verbalized understanding. We have provided the patient with a detailed after visit summary which was reviewed, and red flag symptoms that would warrant an ER visit were emphasized. Dr. Logan Canales, MAGALYC, DNP      This visit was provided as a focused evaluation during the COVID -19 pandemic/national emergency. A comprehensive review of all previous patient history and testing was not conducted. Pertinent findings were elicited during the visit.

## 2020-08-15 LAB — SARS-COV-2, COV2NT: NOT DETECTED

## 2020-08-17 ENCOUNTER — TELEPHONE (OUTPATIENT)
Dept: FAMILY MEDICINE CLINIC | Facility: CLINIC | Age: 45
End: 2020-08-17

## 2020-08-17 NOTE — TELEPHONE ENCOUNTER
Called patient and verified identity with 2 pt identifiers. Notified of Negative covid results. Patient stated she is still experiencing symptoms and has scheduled an appt with her PCP tomorrow morning. Informed patient to call if any questions or concerns.

## 2020-08-17 NOTE — PROGRESS NOTES
[facility] nurses: Please notify pt their Covid test was Negative. Remind pt to stay home but if they must leave home, take all precautions: wear a mask, continue social distancing, disinfect home/work areas, avoid touching the face, and sanitize hands frequently. If they need a return to work note, please provide. Thank you. I believe this patient may have been tested to early as she had just started with symptoms 24 hours prior to the testing. If she is still having symptoms (fevers, SOB, Cough, etc), please have her schedule to come back in for re-evaluation.

## 2020-08-18 ENCOUNTER — VIRTUAL VISIT (OUTPATIENT)
Dept: FAMILY MEDICINE CLINIC | Age: 45
End: 2020-08-18

## 2020-08-18 DIAGNOSIS — R50.9 FEVER, UNSPECIFIED FEVER CAUSE: Primary | ICD-10-CM

## 2020-08-18 NOTE — PROGRESS NOTES
Shu Garcias, who was evaluated through a synchronous (real-time) audio-video encounter, and/or her healthcare decision maker, is aware that it is a billable service, with coverage as determined by her insurance carrier. She provided verbal consent to proceed: Yes, and patient identification was verified. It was conducted pursuant to the emergency declaration under the [address] [facility], [address] [place] [address] and the [employer] and [employer] Act. A caregiver was present when appropriate. Ability to conduct physical exam was limited. I was in the office. The patient was at home. SUBJECTIVE  Chief Complaint   Patient presents with    Fever     last one yesterday     Patient presents for symptoms since last Thursday. Says she wasn't feeling so well. Was at the tail end of her cycle and thought it was due to that. Says she was taking motrin and tylenol for headaches and cramps. She says that she started getting bad diarrhea at work Thursday. No fever at that time. She says she had an upset stomach as well. At home later that day she had a temp up to 100.4 or 100.5. She had a sore throat as well with a slight headache. She had bodyaches at the time as well. She says symptoms continued the whole weekend. No diarrhea through weekend. She says that she has had decreased energy and was having exertional dyspnea and felt overall winded. She says that she went to the flu clinic Friday morning and then went to another clinic Saturday. She says that was with [facility]. She says both tests were negative. Her testing was negative. She says that her fever subsided yesterday morning. She has not been taking motrin or tylenol for greater than 24 hours. Nobody in family is sick. She did go camping a few weeks ago on the [place] and had a lot of bug bites on her legs. She does not recall a tick bite.      ROS:  History obtained from the patient   · HEENT: no nasal conestion  · Respiratory: no cough, shortness of breath, or wheezing  · Cardiovascular: no chest pain, palpitations, or dyspnea on exertion  · Gastrointestinal: no abdominal pain, N/V, or diarrhea  · Musculoskeletal: no current arthralgias or myalgias    OBJECTIVE    General:  Alert, cooperative, well appearing, in no apparent distress. Lungs: Inspiratory and expiratory efforts are full and unlabored. Results for orders placed or performed during the hospital encounter of 08/14/20   NOVEL CORONAVIRUS (COVID-19)   Result Value Ref Range    SARS-CoV-2 Not Detected Not Detected       ASSESSMENT / PLAN    ICD-10-CM ICD-9-CM    1. Fever, unspecified fever cause  R50.9 780.60      Seems to be resolving. Likely a viral illness. Several in office had similar and one other tested negative. Seems to be a short lived viral illness or stomach flu. Call if fevers resume to order a work-up. All chart history elements were reviewed by me at the time of the visit even though marked at time of note closure. Patient understands our medical plan. Patient has provided input and agrees with goals. Alternatives have been explained and offered. All questions answered. The patient is to call if condition worsens or fails to improve. RTC as needed.

## 2020-08-18 NOTE — PROGRESS NOTES
1. Have you been to the ER, urgent care clinic since your last visit? Hospitalized since your last visit? [facility]    2. Have you seen or consulted any other health care providers outside of the [address] [place] [address] since your last visit? Include any pap smears or colon screening.  No

## 2021-08-12 ENCOUNTER — OFFICE VISIT (OUTPATIENT)
Dept: FAMILY MEDICINE CLINIC | Age: 46
End: 2021-08-12
Payer: OTHER GOVERNMENT

## 2021-08-12 VITALS
TEMPERATURE: 97.7 F | SYSTOLIC BLOOD PRESSURE: 110 MMHG | BODY MASS INDEX: 30.66 KG/M2 | RESPIRATION RATE: 14 BRPM | WEIGHT: 184 LBS | OXYGEN SATURATION: 98 % | HEIGHT: 65 IN | DIASTOLIC BLOOD PRESSURE: 78 MMHG | HEART RATE: 91 BPM

## 2021-08-12 DIAGNOSIS — Z13.220 SCREENING CHOLESTEROL LEVEL: ICD-10-CM

## 2021-08-12 DIAGNOSIS — Z00.00 WELL ADULT EXAM: Primary | ICD-10-CM

## 2021-08-12 DIAGNOSIS — E55.9 VITAMIN D DEFICIENCY: ICD-10-CM

## 2021-08-12 DIAGNOSIS — E66.9 OBESITY, UNSPECIFIED CLASSIFICATION, UNSPECIFIED OBESITY TYPE, UNSPECIFIED WHETHER SERIOUS COMORBIDITY PRESENT: ICD-10-CM

## 2021-08-12 DIAGNOSIS — Z12.11 COLON CANCER SCREENING: ICD-10-CM

## 2021-08-12 DIAGNOSIS — Z83.3 FAMILY HISTORY OF DIABETES MELLITUS: ICD-10-CM

## 2021-08-12 PROCEDURE — 99396 PREV VISIT EST AGE 40-64: CPT | Performed by: FAMILY MEDICINE

## 2021-08-12 NOTE — PROGRESS NOTES
1. Have you been to the ER, urgent care clinic since your last visit? Hospitalized since your last visit? No    2. Have you seen or consulted any other health care providers outside of the [address] [place] [address] since your last visit? Include any pap smears or colon screening.  No

## 2021-08-13 NOTE — PROGRESS NOTES
Chief Complaint   Patient presents with    Physical     Subjective:   54 y.o. female for Well Woman Check. Her gyne and breast care is done elsewhere by her Ob-Gyn physician. Allergies   Allergen Reactions    Milk Diarrhea     Past Medical History:   Diagnosis Date    Allergic rhinitis     Hallux valgus     Patellofemoral syndrome     Vitamin D deficiency      Past Surgical History:   Procedure Laterality Date    HX OTHER SURGICAL  3/2013    hysteroscopy - polyp removal     Family History   Problem Relation Age of Onset    Asthma Mother     Hypertension Mother     Diabetes Mother     Cancer Father         prostate    Heart Disease Father         CABG, CAD     Ovarian Cancer Maternal Grandmother     Dementia Paternal Grandmother     Elevated Lipids Brother      Social History     Tobacco Use    Smoking status: Never Smoker    Smokeless tobacco: Never Used   Substance Use Topics    Alcohol use: No      ROS: Feeling generally well. No TIA's or unusual headaches, no dysphagia. No prolonged cough. No dyspnea or chest pain on exertion. No abdominal pain, change in bowel habits, black or bloody stools. However she does have occasional diarrhea and upset stomach a few days a week after consuming morning coffee. No urinary tract symptoms. No new or unusual musculoskeletal symptoms. Specific concerns today:   No acute or chronic concerns today. Objective: The patient appears well, alert, oriented x 3, in no distress. Visit Vitals  /78 (BP 1 Location: Left upper arm, BP Patient Position: Sitting, BP Cuff Size: Large adult)   Pulse 91   Temp 97.7 °F (36.5 °C) (Temporal)   Resp 14   Ht 5' 5\" (1.651 m)   Wt 184 lb (83.5 kg)   LMP 08/01/2021   SpO2 98%   BMI 30.62 kg/m²     ENT normal.  Neck supple. No adenopathy or thyromegaly. GEOVANNY. Lungs are clear, good air entry, no wheezes, rhonchi or rales. S1 and S2 normal, no murmurs, regular rate and rhythm.  Abdomen soft without tenderness, guarding, mass or organomegaly. Extremities show no edema. Neurological is normal, no focal findings. Psych: normal affect. Mood good. Oriented x 3. Judgement and insight intact. Breast and Pelvic exams are deferred. Assessment/Plan:       ICD-10-CM ICD-9-CM    1. Well adult exam  Z00.00 V70.0 CBC WITH AUTOMATED DIFF      METABOLIC PANEL, COMPREHENSIVE   2. Vitamin D deficiency  E55.9 268.9 VITAMIN D, 25 HYDROXY   3. Obesity, unspecified classification, unspecified obesity type, unspecified whether serious comorbidity present  E66.9 278.00 HEMOGLOBIN A1C W/O EAG   4. Colon cancer screening  Z12.11 V76.51 COLOGUARD TEST (FECAL DNA COLORECTAL CANCER SCREENING)   5. Screening cholesterol level  Z13.220 V77.91 LIPID PANEL   6. Family history of diabetes mellitus  Z83.3 V18.0 HEMOGLOBIN A1C W/O EAG     Age and sex specific counseling. Fasting labs ordered. Diet and exercise. Encourage to see OB/GYN for well woman exam as she is overdue. She will discuss mammography with them. Cologuard ordered. All chart history elements were reviewed by me at the time of the visit even though marked at time of note closure. Patient understands our medical plan. Patient has provided input and agrees with goals. Alternatives have been explained and offered. All questions answered. The patient is to call if condition worsens or fails to improve. Follow-up and Dispositions    · Return in about 1 year (around 8/12/2022) for physical. Labs due in 1-2 weeks.

## 2022-03-30 ENCOUNTER — OFFICE VISIT (OUTPATIENT)
Dept: FAMILY MEDICINE CLINIC | Age: 47
End: 2022-03-30
Payer: OTHER GOVERNMENT

## 2022-03-30 VITALS
BODY MASS INDEX: 32.65 KG/M2 | OXYGEN SATURATION: 98 % | RESPIRATION RATE: 18 BRPM | TEMPERATURE: 98.1 F | WEIGHT: 196 LBS | HEIGHT: 65 IN | HEART RATE: 78 BPM | SYSTOLIC BLOOD PRESSURE: 110 MMHG | DIASTOLIC BLOOD PRESSURE: 70 MMHG

## 2022-03-30 DIAGNOSIS — Z12.31 ENCOUNTER FOR SCREENING MAMMOGRAM FOR MALIGNANT NEOPLASM OF BREAST: ICD-10-CM

## 2022-03-30 DIAGNOSIS — M79.671 RIGHT FOOT PAIN: ICD-10-CM

## 2022-03-30 DIAGNOSIS — M25.561 PATELLOFEMORAL ARTHRALGIA OF RIGHT KNEE: Primary | ICD-10-CM

## 2022-03-30 PROCEDURE — 99213 OFFICE O/P EST LOW 20 MIN: CPT | Performed by: FAMILY MEDICINE

## 2022-03-30 RX ORDER — MELOXICAM 15 MG/1
15 TABLET ORAL DAILY
Qty: 14 TABLET | Refills: 0 | Status: SHIPPED
Start: 2022-03-30 | End: 2022-05-19

## 2022-03-30 NOTE — PROGRESS NOTES
Chief Complaint   Patient presents with    Foot Pain     c/o right foot pain     Knee Pain     c/o right knee pain     Joint Pain       1. \"Have you been to the ER, urgent care clinic since your last visit? Hospitalized since your last visit? \" No    2. \"Have you seen or consulted any other health care providers outside of the [address] [place] [address] since your last visit? \" No     3. For patients aged 39-70: Has the patient had a colonoscopy / FIT/ Cologuard? No      If the patient is female:    4. For patients aged 41-77: Has the patient had a mammogram within the past 2 years? Yes - Care Gap present. Rooming MA/LPN to request most recent results 2019       5. For patients aged 21-65: Has the patient had a pap smear? Yes - Care Gap present.  Rooming MA/LPN to request most recent results      Health Maintenance Due   Topic Date Due    COVID-19 Vaccine (1) Never done    Lipid Screen  Never done    Colorectal Cancer Screening Combo  Never done    Breast Cancer Screen Mammogram  09/21/2019    Cervical cancer screen  09/11/2020    Flu Vaccine (1) 09/01/2021

## 2022-03-30 NOTE — PROGRESS NOTES
SUBJECTIVE  Chief Complaint   Patient presents with    Foot Pain     c/o right foot pain     Knee Pain     c/o right knee pain     Joint Pain     The patient presents complaining of a recent onset of right knee and foot pain. She says it started several weeks ago when she started walking 10 minutes daily on an incline treadmill. She says that she stared to get anterior ankle pain along her ankle flexor tendons and knee pain. She says that she stopped that 2 weeks ago and has made considerable improvement. She has been taking some motrin which has helped as well. She has started some of her old rehab exercises also. Injury: denies trauma like twisting injuries. Radiation: denies  Swelling: felt swollen  Aggravating factors: walking, especially down stairs  Relieving factors: Motrin   Has tried: Motrin    OBJECTIVE    Blood pressure 110/70, pulse 78, temperature 98.1 °F (36.7 °C), temperature source Temporal, resp. rate 18, height 5' 5\" (1.651 m), weight 196 lb (88.9 kg), last menstrual period 03/22/2022, SpO2 98 %. General:  alert, cooperative, well appearing, in no apparent distress. Right Knee: Inspection of the knee demonstrates there is no obvious deformity. There is no appreciable effusion. There is no evidence of dislocation. There is normal flexion and extension of the bilateral knee and there is minimal crepitus. Assessment of ligamentous stability demonstrates the ACL/PCL/LCL/MCL are all intact. Palpation demonstrates there is no medial or lateral joint line tenderness. There is no tenderness of the pes anserine bursa. There is no tenderness along the tibial tuberosity. The patellar tendon is nontender. There is no pain with rocking and grinding. Right foot: full ROM, no swelling. Gait slightly antalgic initially. Skin:  There is no redness or warmth. Psych: normal affect. Mood good. Oriented x 3. Judgement and insight intact.      ASSESSMENT / PLAN    ICD-10-CM ICD-9-CM 1. Patellofemoral arthralgia of right knee  M25.561 719.46    2. Right foot pain  M79.671 729.5    3. Encounter for screening mammogram for malignant neoplasm of breast  Z12.31 V76.12 BASHIR MAMMO BI SCREENING INCL CAD     PFS / foot pain (overuse) - Cont activity modification which has helped. HEP / rehab exercises beneficial.  Mobic 15mg with food daily for 7-14 days. Avoid other NSAIDs. Referred for mammogram.  She says she keeps up with OB/GYN. All chart history elements were reviewed by me at the time of the visit even though marked at time of note closure. Patient understands our medical plan. Patient has provided input and agrees with goals. Alternatives have been explained and offered. All questions answered. The patient is to call if condition worsens or fails to improve.      Follow-up and Dispositions    · Return in about 5 months (around 8/30/2022) for physical.

## 2022-03-30 NOTE — PATIENT INSTRUCTIONS
Patellofemoral Pain Syndrome (Runner's Knee): Exercises  Introduction  Here are some examples of exercises for you to try. The exercises may be suggested for a condition or for rehabilitation. Start each exercise slowly. Ease off the exercises if you start to have pain. You will be told when to start these exercises and which ones will work best for you. How to do the exercises  Calf wall stretch    1. Stand facing a wall with your hands on the wall at about eye level. Put your affected leg about a step behind your other leg. 2. Keeping your back leg straight and your back heel on the floor, bend your front knee and gently bring your hip and chest toward the wall until you feel a stretch in the calf of your back leg. 3. Hold the stretch for at least 15 to 30 seconds. 4. Repeat 2 to 4 times. 5. Repeat steps 1 through 4, but this time keep your back knee bent. Quadriceps stretch    1. If you are not steady on your feet, hold on to a chair, counter, or wall. 2. Bend your affected leg, and reach behind you to grab the front of your foot or ankle with the hand on the same side. For example, if you are stretching your right leg, use your right hand. 3. Keeping your knees next to each other, pull your foot toward your buttock until you feel a gentle stretch across the front of your hip and down the front of your thigh. Your knee should be pointed directly to the ground, and not out to the side. 4. Hold the stretch for at least 15 to 30 seconds. 5. Repeat 2 to 4 times. Hamstring wall stretch    1. Lie on your back in a doorway, with your good leg through the open door. 2. Slide your affected leg up the wall to straighten your knee. You should feel a gentle stretch down the back of your leg. 3. Hold the stretch for at least 1 minute. Then over time, try to lengthen the time you hold the stretch to as long as 6 minutes. 4. Repeat 2 to 4 times.   5. If you do not have a place to do this exercise in a doorway, there is another way to do it:  6. Lie on your back, and bend your affected leg. 7. Loop a towel under the ball and toes of that foot, and hold the ends of the towel in your hands. 8. Straighten your knee, and slowly pull back on the towel. You should feel a gentle stretch down the back of your leg. 9. Hold the stretch for at least 15 to 30 seconds. Or even better, hold the stretch for 1 minute if you can. 10. Repeat 2 to 4 times. 1. Do not arch your back. 2. Do not bend either knee. 3. Keep one heel touching the floor and the other heel touching the wall. Do not point your toes. Quad sets    1. Sit with your affected leg straight and supported on the floor or a firm bed. Place a small, rolled-up towel under your affected knee. Your other leg should be bent, with that foot flat on the floor. 2. Tighten the thigh muscles of your affected leg by pressing the back of your knee down into the towel. 3. Hold for about 6 seconds, then rest for up to 10 seconds. 4. Repeat 8 to 12 times. Straight-leg raises to the front    1. Lie on your back with your good knee bent so that your foot rests flat on the floor. Your affected leg should be straight. Make sure that your low back has a normal curve. You should be able to slip your hand in between the floor and the small of your back, with your palm touching the floor and your back touching the back of your hand. 2. Tighten the thigh muscles in your affected leg by pressing the back of your knee flat down to the floor. Hold your knee straight. 3. Keeping the thigh muscles tight and your leg straight, lift your affected leg up so that your heel is about 12 inches off the floor. 4. Hold for about 6 seconds, then lower your leg slowly. Rest for up to 10 seconds between repetitions. 5. Repeat 8 to 12 times. Straight-leg raises to the back    1. Lie on your stomach, and lift your leg straight up behind you (toward the ceiling).   2. Lift your toes about 6 inches off the floor, hold for about 6 seconds, then lower slowly. 3. Do 8 to 12 repetitions.

## 2022-04-08 ENCOUNTER — TRANSCRIBE ORDER (OUTPATIENT)
Dept: SCHEDULING | Age: 47
End: 2022-04-08

## 2022-04-19 ENCOUNTER — HOSPITAL ENCOUNTER (OUTPATIENT)
Dept: MAMMOGRAPHY | Age: 47
Discharge: HOME | End: 2022-04-19
Attending: FAMILY MEDICINE
Payer: OTHER GOVERNMENT

## 2022-04-19 DIAGNOSIS — Z12.31 ENCOUNTER FOR SCREENING MAMMOGRAM FOR MALIGNANT NEOPLASM OF BREAST: ICD-10-CM

## 2022-04-19 PROCEDURE — 77067 SCR MAMMO BI INCL CAD: CPT

## 2022-05-04 ENCOUNTER — TELEPHONE (OUTPATIENT)
Dept: FAMILY MEDICINE CLINIC | Age: 47
End: 2022-05-04

## 2022-05-04 DIAGNOSIS — R53.83 FATIGUE, UNSPECIFIED TYPE: ICD-10-CM

## 2022-05-04 DIAGNOSIS — Z83.3 FAMILY HISTORY OF DIABETES MELLITUS: ICD-10-CM

## 2022-05-04 DIAGNOSIS — E66.9 OBESITY, UNSPECIFIED CLASSIFICATION, UNSPECIFIED OBESITY TYPE, UNSPECIFIED WHETHER SERIOUS COMORBIDITY PRESENT: ICD-10-CM

## 2022-05-04 DIAGNOSIS — Z13.220 SCREENING CHOLESTEROL LEVEL: ICD-10-CM

## 2022-05-04 DIAGNOSIS — E55.9 VITAMIN D DEFICIENCY: Primary | ICD-10-CM

## 2022-05-04 NOTE — TELEPHONE ENCOUNTER
Labs ordered Aug of 2021 which she never completed. I re-ordered these and added a TSH. It is fasting. She needs to get these done and schedule a follow-up so we can take more history. Nurse to advise.

## 2022-05-04 NOTE — TELEPHONE ENCOUNTER
[facility] trx a call from pt on the triage line and states that they had sent a message about the pt wanting labs done because she is extremely tired all the time. [facility] didn't give any other symptoms of why they were calling on the triage line but that the pt had hung up before she went back to put her through. [facility] states that the pt was at work. Please advise.

## 2022-05-05 ENCOUNTER — HOSPITAL ENCOUNTER (OUTPATIENT)
Dept: LAB | Age: 47
Discharge: HOME | End: 2022-05-05
Payer: OTHER GOVERNMENT

## 2022-05-05 DIAGNOSIS — Z13.220 SCREENING CHOLESTEROL LEVEL: ICD-10-CM

## 2022-05-05 DIAGNOSIS — E55.9 VITAMIN D DEFICIENCY: ICD-10-CM

## 2022-05-05 DIAGNOSIS — Z83.3 FAMILY HISTORY OF DIABETES MELLITUS: ICD-10-CM

## 2022-05-05 DIAGNOSIS — R53.83 FATIGUE, UNSPECIFIED TYPE: ICD-10-CM

## 2022-05-05 DIAGNOSIS — E66.9 OBESITY, UNSPECIFIED CLASSIFICATION, UNSPECIFIED OBESITY TYPE, UNSPECIFIED WHETHER SERIOUS COMORBIDITY PRESENT: ICD-10-CM

## 2022-05-05 LAB
25(OH)D3 SERPL-MCNC: 45.5 NG/ML (ref 30–100)
ALBUMIN SERPL-MCNC: 3.6 G/DL (ref 3.4–5)
ALBUMIN/GLOB SERPL: 0.9 {RATIO} (ref 0.8–1.7)
ALP SERPL-CCNC: 68 U/L (ref 45–117)
ALT SERPL-CCNC: 22 U/L (ref 13–56)
ANION GAP SERPL CALC-SCNC: 3 MMOL/L (ref 3–18)
AST SERPL-CCNC: 14 U/L (ref 10–38)
BASOPHILS # BLD: 0 K/UL (ref 0–0.1)
BASOPHILS NFR BLD: 1 % (ref 0–2)
BILIRUB SERPL-MCNC: 0.5 MG/DL (ref 0.2–1)
BUN SERPL-MCNC: 13 MG/DL (ref 7–18)
BUN/CREAT SERPL: 14 (ref 12–20)
CALCIUM SERPL-MCNC: 9.2 MG/DL (ref 8.5–10.1)
CHLORIDE SERPL-SCNC: 108 MMOL/L (ref 100–111)
CHOLEST SERPL-MCNC: 146 MG/DL
CO2 SERPL-SCNC: 28 MMOL/L (ref 21–32)
CREAT SERPL-MCNC: 0.91 MG/DL (ref 0.6–1.3)
DIFFERENTIAL METHOD BLD: ABNORMAL
EOSINOPHIL # BLD: 0.1 K/UL (ref 0–0.4)
EOSINOPHIL NFR BLD: 2 % (ref 0–5)
ERYTHROCYTE [DISTWIDTH] IN BLOOD BY AUTOMATED COUNT: 13.2 % (ref 11.6–14.5)
GLOBULIN SER CALC-MCNC: 3.8 G/DL (ref 2–4)
GLUCOSE SERPL-MCNC: 92 MG/DL (ref 74–99)
HBA1C MFR BLD: 5.3 % (ref 4.2–5.6)
HCT VFR BLD AUTO: 41.8 % (ref 35–45)
HDLC SERPL-MCNC: 45 MG/DL (ref 40–60)
HDLC SERPL: 3.2 {RATIO} (ref 0–5)
HGB BLD-MCNC: 13.1 G/DL (ref 12–16)
IMM GRANULOCYTES # BLD AUTO: 0 K/UL (ref 0–0.04)
IMM GRANULOCYTES NFR BLD AUTO: 0 % (ref 0–0.5)
LDLC SERPL CALC-MCNC: 93.2 MG/DL (ref 0–100)
LIPID PROFILE,FLP: NORMAL
LYMPHOCYTES # BLD: 1.5 K/UL (ref 0.9–3.6)
LYMPHOCYTES NFR BLD: 43 % (ref 21–52)
MCH RBC QN AUTO: 27.1 PG (ref 24–34)
MCHC RBC AUTO-ENTMCNC: 31.3 G/DL (ref 31–37)
MCV RBC AUTO: 86.5 FL (ref 78–100)
MONOCYTES # BLD: 0.4 K/UL (ref 0.05–1.2)
MONOCYTES NFR BLD: 12 % (ref 3–10)
NEUTS SEG # BLD: 1.5 K/UL (ref 1.8–8)
NEUTS SEG NFR BLD: 42 % (ref 40–73)
NRBC # BLD: 0 K/UL (ref 0–0.01)
NRBC BLD-RTO: 0 PER 100 WBC
PLATELET # BLD AUTO: 397 K/UL (ref 135–420)
PMV BLD AUTO: 9.9 FL (ref 9.2–11.8)
POTASSIUM SERPL-SCNC: 3.9 MMOL/L (ref 3.5–5.5)
PROT SERPL-MCNC: 7.4 G/DL (ref 6.4–8.2)
RBC # BLD AUTO: 4.83 M/UL (ref 4.2–5.3)
SODIUM SERPL-SCNC: 139 MMOL/L (ref 136–145)
TRIGL SERPL-MCNC: 39 MG/DL (ref ?–150)
TSH SERPL DL<=0.05 MIU/L-ACNC: 1.17 UIU/ML (ref 0.36–3.74)
VLDLC SERPL CALC-MCNC: 7.8 MG/DL
WBC # BLD AUTO: 3.4 K/UL (ref 4.6–13.2)

## 2022-05-05 PROCEDURE — 80061 LIPID PANEL: CPT

## 2022-05-05 PROCEDURE — 82306 VITAMIN D 25 HYDROXY: CPT

## 2022-05-05 PROCEDURE — 36415 COLL VENOUS BLD VENIPUNCTURE: CPT

## 2022-05-05 PROCEDURE — 80053 COMPREHEN METABOLIC PANEL: CPT

## 2022-05-05 PROCEDURE — 85025 COMPLETE CBC W/AUTO DIFF WBC: CPT

## 2022-05-05 PROCEDURE — 84443 ASSAY THYROID STIM HORMONE: CPT

## 2022-05-05 PROCEDURE — 83036 HEMOGLOBIN GLYCOSYLATED A1C: CPT

## 2022-05-19 ENCOUNTER — OFFICE VISIT (OUTPATIENT)
Dept: FAMILY MEDICINE CLINIC | Age: 47
End: 2022-05-19
Payer: OTHER GOVERNMENT

## 2022-05-19 VITALS
OXYGEN SATURATION: 98 % | HEIGHT: 65 IN | DIASTOLIC BLOOD PRESSURE: 74 MMHG | BODY MASS INDEX: 32.15 KG/M2 | HEART RATE: 78 BPM | RESPIRATION RATE: 16 BRPM | SYSTOLIC BLOOD PRESSURE: 120 MMHG | TEMPERATURE: 98 F | WEIGHT: 193 LBS

## 2022-05-19 DIAGNOSIS — R53.83 FATIGUE, UNSPECIFIED TYPE: Primary | ICD-10-CM

## 2022-05-19 PROCEDURE — 99212 OFFICE O/P EST SF 10 MIN: CPT | Performed by: FAMILY MEDICINE

## 2022-05-19 RX ORDER — NORETHINDRONE ACETATE AND ETHINYL ESTRADIOL, ETHINYL ESTRADIOL AND FERROUS FUMARATE 1MG-10(24)
KIT ORAL
COMMUNITY
Start: 2022-03-04

## 2022-05-19 NOTE — PATIENT INSTRUCTIONS
Fatigue: Care Instructions  Your Care Instructions     Fatigue is a feeling of tiredness, exhaustion, or lack of energy. You may feel fatigue because of too much or not enough activity. It can also come from stress, lack of sleep, boredom, and poor diet. Many medical problems, such as viral infections, can cause fatigue. Emotional problems, especially depression, are often the cause of fatigue. Fatigue is most often a symptom of another problem. Treatment for fatigue depends on the cause. For example, if you have fatigue because you have a certain health problem, treating this problem also treats your fatigue. If depression or anxiety is the cause, treatment may help. Follow-up care is a key part of your treatment and safety. Be sure to make and go to all appointments, and call your doctor if you are having problems. It's also a good idea to know your test results and keep a list of the medicines you take. How can you care for yourself at home? · Get regular exercise. But don't overdo it. Go back and forth between rest and exercise. · Get plenty of rest.  · Eat a healthy diet. Do not skip meals, especially breakfast.  · Reduce your use of caffeine, tobacco, and alcohol. Caffeine is most often found in coffee, tea, cola drinks, and chocolate. · Limit medicines that can cause fatigue. This includes tranquilizers and cold and allergy medicines. When should you call for help? Watch closely for changes in your health, and be sure to contact your doctor if:    · You have new symptoms such as fever or a rash.     · Your fatigue gets worse.     · You have been feeling down, depressed, or hopeless. Or you may have lost interest in things that you usually enjoy.     · You are not getting better as expected. Where can you learn more? Go to http://www.gray.com/  Enter [id number] in the search box to learn more about \"Fatigue: Care Instructions. \"  Current as of: July 1, 2021               Content Version: 13.2  © 1181-6925 [employer], [employer]orporated. Care instructions adapted under license by [employer] (which disclaims liability or warranty for this information). If you have questions about a medical condition or this instruction, always ask your healthcare professional. [address] any warranty or liability for your use of this information.

## 2022-05-19 NOTE — PROGRESS NOTES
1. \"Have you been to the ER, urgent care clinic since your last visit? Hospitalized since your last visit? \" No    2. \"Have you seen or consulted any other health care providers outside of the [address] [place] [address] since your last visit? \" No     3. For patients aged 39-70: Has the patient had a colonoscopy / FIT/ Cologuard? No      If the patient is female:    4. For patients aged 41-77: Has the patient had a mammogram within the past 2 years? Yes - no Care Gap present      5. For patients aged 21-65: Has the patient had a pap smear?  Yes - no Care Gap present

## 2022-05-19 NOTE — PROGRESS NOTES
SUBJECTIVE  Chief Complaint   Patient presents with    Fatigue    Results      Patient presents complaining of a long history of fatigue. She requested labs to investigate this. No hypothyroid symptoms. No chest pain or dyspnea. Sleepiness during daytime is common. She does snore. OBJECTIVE    Blood pressure 120/74, pulse 78, temperature 98 °F (36.7 °C), temperature source Temporal, resp. rate 16, height 5' 5\" (1.651 m), weight 193 lb (87.5 kg), last menstrual period 05/15/2022, SpO2 98 %. General:  Alert, cooperative, well appearing, in no apparent distress. CV:  The heart sounds are regular in rate and rhythm. There is a normal S1 and S2. There or no murmurs. Lungs: Inspiratory and expiratory efforts are full and unlabored. Lung sounds are clear and equal to auscultation throughout all lung fields without wheezing, rales, or rhonchi. Psych: normal affect. Mood good. Oriented x 3. Judgement and insight intact.      Results for orders placed or performed during the hospital encounter of 05/05/22   VITAMIN D, 25 HYDROXY   Result Value Ref Range    Vitamin D 25-Hydroxy 45.5 30 - 100 ng/mL   HEMOGLOBIN A1C W/O EAG   Result Value Ref Range    Hemoglobin A1c 5.3 4.2 - 5.6 %   LIPID PANEL   Result Value Ref Range    LIPID PROFILE          Cholesterol, total 146 <200 MG/DL    Triglyceride 39 <150 MG/DL    HDL Cholesterol 45 40 - 60 MG/DL    LDL, calculated 93.2 0 - 100 MG/DL    VLDL, calculated 7.8 MG/DL    CHOL/HDL Ratio 3.2 0 - 5.0     CBC WITH AUTOMATED DIFF   Result Value Ref Range    WBC 3.4 (L) 4.6 - 13.2 K/uL    RBC 4.83 4.20 - 5.30 M/uL    HGB 13.1 12.0 - 16.0 g/dL    HCT 41.8 35.0 - 45.0 %    MCV 86.5 78.0 - 100.0 FL    MCH 27.1 24.0 - 34.0 PG    MCHC 31.3 31.0 - 37.0 g/dL    RDW 13.2 11.6 - 14.5 %    PLATELET 731 352 - 033 K/uL    MPV 9.9 9.2 - 11.8 FL    NRBC 0.0 0  WBC    ABSOLUTE NRBC 0.00 0.00 - 0.01 K/uL    NEUTROPHILS 42 40 - 73 %    LYMPHOCYTES 43 21 - 52 %    MONOCYTES 12 (H) 3 - 10 %    EOSINOPHILS 2 0 - 5 %    BASOPHILS 1 0 - 2 %    IMMATURE GRANULOCYTES 0 0.0 - 0.5 %    ABS. NEUTROPHILS 1.5 (L) 1.8 - 8.0 K/UL    ABS. LYMPHOCYTES 1.5 0.9 - 3.6 K/UL    ABS. MONOCYTES 0.4 0.05 - 1.2 K/UL    ABS. EOSINOPHILS 0.1 0.0 - 0.4 K/UL    ABS. BASOPHILS 0.0 0.0 - 0.1 K/UL    ABS. IMM. GRANS. 0.0 0.00 - 0.04 K/UL    DF AUTOMATED     METABOLIC PANEL, COMPREHENSIVE   Result Value Ref Range    Sodium 139 136 - 145 mmol/L    Potassium 3.9 3.5 - 5.5 mmol/L    Chloride 108 100 - 111 mmol/L    CO2 28 21 - 32 mmol/L    Anion gap 3 3.0 - 18 mmol/L    Glucose 92 74 - 99 mg/dL    BUN 13 7.0 - 18 MG/DL    Creatinine 0.91 0.6 - 1.3 MG/DL    BUN/Creatinine ratio 14 12 - 20      GFR est AA >60 >60 ml/min/1.73m2    GFR est non-AA >60 >60 ml/min/1.73m2    Calcium 9.2 8.5 - 10.1 MG/DL    Bilirubin, total 0.5 0.2 - 1.0 MG/DL    ALT (SGPT) 22 13 - 56 U/L    AST (SGOT) 14 10 - 38 U/L    Alk. phosphatase 68 45 - 117 U/L    Protein, total 7.4 6.4 - 8.2 g/dL    Albumin 3.6 3.4 - 5.0 g/dL    Globulin 3.8 2.0 - 4.0 g/dL    A-G Ratio 0.9 0.8 - 1.7     TSH 3RD GENERATION   Result Value Ref Range    TSH 1.17 0.36 - 3.74 uIU/mL     ASSESSMENT / PLAN    ICD-10-CM ICD-9-CM    1. Fatigue, unspecified type  R53.83 780.79      Reviewed labs. Declines sleep referral.   Provided her with an [place] Sleepiness Scale to do at home and score. All chart history elements were reviewed by me at the time of the visit even though marked at time of note closure. Patient understands our medical plan. Patient has provided input and agrees with goals. Alternatives have been explained and offered. All questions answered. The patient is to call if condition worsens or fails to improve.      Follow-up and Dispositions    · Return in about 1 year (around 5/19/2023) for physical.

## 2023-01-03 ENCOUNTER — PATIENT MESSAGE (OUTPATIENT)
Dept: FAMILY MEDICINE CLINIC | Age: 48
End: 2023-01-03

## 2023-01-03 DIAGNOSIS — M25.561 CHRONIC PAIN OF BOTH KNEES: Primary | ICD-10-CM

## 2023-01-03 DIAGNOSIS — G89.29 CHRONIC PAIN OF BOTH KNEES: Primary | ICD-10-CM

## 2023-01-03 DIAGNOSIS — M25.562 CHRONIC PAIN OF BOTH KNEES: Primary | ICD-10-CM

## 2023-01-03 NOTE — TELEPHONE ENCOUNTER
From: Sy Serrato  To: Aldo Huff MD  Sent: 1/3/2023 6:55 AM EST  Subject: Knee pain    Good morning,  I am wondering if you could refer me to a specialists for ongoing issues with my knees. I have been experiencing pain and pressure in my left knee for about a week. I have tried ibuprofen and have been wearing a knee brace as needed. The pressure is mostly experienced when going up stairs. Usually the knee issues are brought on whenever I try to exercise or increase my activity levels. I have not been doing anything intensejust basic floor exercises, walking on the treadmill (no incline) and a few minutes of cycling. However, once the knee issues flare up, then I usually have to slow down my physical activity until my knees recover. Of course this is not good for my health. This seems to be an ongoing cycle and I would like to figure out why my am having these repeated issues with my knees. My goal is to have a consistent daily fitness routine, but this is very difficult to achieve with the constant flare ups of knee pain. I have done physical therapy in the past. I am open to   trying this again, but would also like further diagnosis to figure out how to prevent the issues I am having.

## 2023-01-24 ENCOUNTER — HOSPITAL ENCOUNTER (OUTPATIENT)
Dept: OCCUPATIONAL MEDICINE | Age: 48
Discharge: HOME | End: 2023-01-24
Attending: FAMILY MEDICINE

## 2023-01-24 ENCOUNTER — OFFICE VISIT (OUTPATIENT)
Dept: ORTHOPEDIC SURGERY | Age: 48
End: 2023-01-24
Payer: COMMERCIAL

## 2023-01-24 VITALS — WEIGHT: 188 LBS | BODY MASS INDEX: 31.28 KG/M2

## 2023-01-24 DIAGNOSIS — M17.12 PRIMARY LOCALIZED OSTEOARTHRITIS OF LEFT KNEE: ICD-10-CM

## 2023-01-24 DIAGNOSIS — M22.2X2 PATELLOFEMORAL DISORDER, LEFT: Primary | ICD-10-CM

## 2023-01-24 DIAGNOSIS — M22.2X2 PATELLOFEMORAL DISORDER, LEFT: ICD-10-CM

## 2023-01-24 PROCEDURE — 99204 OFFICE O/P NEW MOD 45 MIN: CPT | Performed by: FAMILY MEDICINE

## 2023-01-24 RX ORDER — DICLOFENAC SODIUM 50 MG/1
50 TABLET, DELAYED RELEASE ORAL 2 TIMES DAILY
Qty: 60 TABLET | Refills: 1 | Status: SHIPPED | OUTPATIENT
Start: 2023-01-24

## 2023-01-24 NOTE — PROGRESS NOTES
HISTORY OF PRESENT ILLNESS    Juan Watson 1975 is a 49y.o. year old female comes in today as new patient for: pain knees    Patients symptoms have been present for several years left>right anterior. Pain level 0 - No pain/10 today as somehow great this morning. It has worsened with stairs. Patient has tried:  PT few years prior with benefit. It is described as pain and some pop/snap. Uses brace some and rare motrin. IMAGING: XR knees pending    Past Surgical History:   Procedure Laterality Date    HX OTHER SURGICAL  3/2013    hysteroscopy - polyp removal     Social History     Socioeconomic History    Marital status:    Occupational History    Occupation: chief academic officer   Tobacco Use    Smoking status: Never    Smokeless tobacco: Never   Vaping Use    Vaping Use: Never used   Substance and Sexual Activity    Alcohol use: No    Drug use: No    Sexual activity: Yes     Partners: Male     Social Determinants of Health     Physical Activity: Unknown    Days of Exercise per Week: 0 days      Current Outpatient Medications   Medication Sig Dispense Refill    Lo Loestrin Fe 1 mg-10 mcg (24)/10 mcg (2) tab        Past Medical History:   Diagnosis Date    Allergic rhinitis     Hallux valgus     Patellofemoral syndrome     Vitamin D deficiency      Family History   Problem Relation Age of Onset    Asthma Mother     Hypertension Mother     Diabetes Mother     Cancer Father         prostate    Heart Disease Father         CABG, CAD     Ovarian Cancer Maternal Grandmother     Dementia Paternal Grandmother     Elevated Lipids Brother      ROS:  + swell, no numb    Objective: Wt 188 lb (85.3 kg)   BMI 31.28 kg/m²   NEURO:  Sensation intact light touch B/L lower extremities. Reflexes +2/4 patellar and Achilles bilaterally. MS:  LE Strength +5/5 bilateral .   left Knee:  2+ Effusion . Lachman negative. Valgus negative. Varus negative. negative joint line tenderness medial.  Norma negative. Posterior drawer negative. Noble compression test negative. Patellar apprehension negative. Patellar facet positive tender to palpation medial + crepitance left. Pes anserine negative TTP bilateral     Assessment/Plan:     ICD-10-CM ICD-9-CM    1. Patellofemoral disorder, left  M22.2X2 719.96 XR KNEE LT MAX 2 VWS      REFERRAL TO PHYSICAL THERAPY      diclofenac EC (VOLTAREN) 50 mg EC tablet      2. Primary localized osteoarthritis of left knee  M17.12 715.16 XR KNEE LT MAX 2 VWS      REFERRAL TO PHYSICAL THERAPY      diclofenac EC (VOLTAREN) 50 mg EC tablet          Patient (or guardian if minor) verbalizes understanding of evaluation and plan. Will start  ice/sleeve, HEP, PT, and Rx for voltaren 50mg  as above and plan follow-up 6 weeks.

## 2023-01-24 NOTE — PATIENT INSTRUCTIONS
Search YouTube for my channel:    Dr. Catie Boss    Runner's Knee          Hickey Compression Sleeve - [employer]    P-Bishnu Closed Patella Compression Sleeve - [employer]    Ice 15-20 minutes at night to get rid of swelling, then Neoprene Knee Compression Sleeve during the day to keep it from coming back. May take off at night. If slipping down can fold an inch of the top over or spray upper knee with a little hair spray prior to putting on.

## 2023-01-24 NOTE — PROGRESS NOTES
AVS reviewed: YES  HEP: AT reviewed  Resources Provided: YES YouTube Videos  Patient questions/concerns answered: YES  Patient verbalized understanding of treatment plan: YES

## 2023-01-24 NOTE — LETTER
1/24/2023    Patient: Troy Auguste   YOB: 1975   Date of Visit: 1/24/2023     MD Karen Bergeron [address]  [place]    Dear Kiel Gale MD,      Thank you for referring Ms. Ron Willis to [place] [address]. for evaluation. My notes for this consultation are attached. If you have questions, please do not hesitate to call me. I look forward to following your patient along with you.       Sincerely,    Jami Leach, DO

## 2023-02-01 ENCOUNTER — APPOINTMENT (OUTPATIENT)
Dept: PHYSICAL THERAPY | Age: 48
End: 2023-02-01
Payer: COMMERCIAL

## 2023-02-01 ENCOUNTER — TELEPHONE (OUTPATIENT)
Dept: PHYSICAL THERAPY | Age: 48
End: 2023-02-01

## 2023-02-10 ENCOUNTER — HOSPITAL ENCOUNTER (OUTPATIENT)
Dept: PHYSICAL THERAPY | Age: 48
Discharge: HOME | End: 2023-02-10
Payer: COMMERCIAL

## 2023-02-10 PROCEDURE — 97162 PT EVAL MOD COMPLEX 30 MIN: CPT

## 2023-02-10 NOTE — PROGRESS NOTES
[facility]  [address]  [place], [address]  [phone] fax    Plan of Care/ Statement of Necessity for Physical Therapy Services    Patient name: Jodi Perera Start of Care: 2/10/2023   Referral source: David Huber DO : 1975    Medical Diagnosis: Pain in left knee [M25.562]  Payor: BLUE CROSS / Plan: Trinda Smiling / Product Type: HMO /  Onset Date:Dec 2022    Treatment Diagnosis: Left knee pain   Prior Hospitalization: see medical history Provider#: [id number]   Medications: Verified on Patient summary List    Comorbidities: OA, LBP   Prior Level of Function: functionally I with daily tasks     The Plan of Care and following information is based on the information from the initial evaluation. Assessment/ key information: 48 y/o female presents with c/o left anterior knee pain that started in December with working out and worsened after slipping on some ice and falling. The pt reports improvement since onset but pain increases with increased activity, stairs, and squatting. The pt demonstrates limited left knee AROM, limited B LE flexibility, limited B hip strength, decreased left quad and B HS strength. + tenderness to palpation is noted over the patellar ligament and distal quads. The pt will benefit from PT to address the aforementioned impairments. Evaluation Complexity History MEDIUM  Complexity : 1-2 comorbidities / personal factors will impact the outcome/ POC ; Examination MEDIUM Complexity : 3 Standardized tests and measures addressing body structure, function, activity limitation and / or participation in recreation  ;Presentation MEDIUM Complexity : Evolving with changing characteristics  ; Clinical Decision Making MEDIUM Complexity : FOTO score of 26-74  Overall Complexity Rating: MEDIUM  Problem List: pain affecting function, decrease ROM, decrease strength, decrease ADL/ functional abilitiies, decrease activity tolerance, and decrease flexibility/ joint mobility   Treatment Plan may include any combination of the following: Therapeutic exercise, Neuromuscular reeducation, Manual therapy, Therapeutic activity, Self care/home management, Electric stim unattended , Vasopneumatic device, Aquatic therapy, Gait training, Ultrasound, and Electric stim attended  Patient / Family readiness to learn indicated by: asking questions, trying to perform skills, and interest  Persons(s) to be included in education: patient (P)  Barriers to Learning/Limitations: None  Patient Goal (s): To strengthen the muscles to support my knee  Patient Self Reported Health Status: good  Rehabilitation Potential: good    Short Term Goals: To be accomplished in 1 weeks:   1. The pt will be I and compliant with HEP    IE- issued HEP   Long Term Goals: To be accomplished in 4 weeks:   1. Improve FOTO score to the predicted outcome for improved ability for ADL   IE- 64   2. Improve left knee AROM flexion to 115 degress to improve  ability for functional tasks   IE- 110 degrees with pain   3. Improve B glute max MMT to 4-/5 to improve ability for functional activities. IE- 3-/5   4. Improve left quad MMT to 5/5 without pain to improve tolerance to daily activities. IE- 4+/5 with pain   5. The pt will report at least 50% improvement to improve tolerance to ADL   IE- 0  Frequency / Duration: Patient to be seen 2 times per week for 4 weeks. Patient/ Caregiver education and instruction: Diagnosis, prognosis, self care, activity modification, and exercises   [x]  Plan of care has been reviewed with LAKEISHA Jeff, PT 2/10/2023 1:30 PM    ________________________________________________________________________    I certify that the above Therapy Services are being furnished while the patient is under my care. I agree with the treatment plan and certify that this therapy is necessary.     [facility] Signature:____________Date:_________TIME:________     Yomaira Irvin, DO  ** Signature, Date and Time must be completed for valid certification **    Please sign and return to [facility] [address] [place] [address] & [address]  [address]  [employer], [facility] [address]  [phone] fax

## 2023-02-10 NOTE — PROGRESS NOTES
PT DAILY TREATMENT NOTE     Patient Name: Jimmy Hamman  Date:2/10/2023  : 1975  [x]  Patient  Verified  Payor: Elvi Chandra / Plan: Debbie Sabillon / Product Type: HMO /    In time:1:30  Out time:2:10  Total Treatment Time (min): 40  Visit #: 1 of 8    Medicare/BCBS Only   Total Timed Codes (min):  15 1:1 Treatment Time:  40       Treatment Area: Pain in left knee [M25.562]    SUBJECTIVE  Pain Level (0-10 scale): 0  Any medication changes, allergies to medications, adverse drug reactions, diagnosis change, or new procedure performed?: [x] No    [] Yes (see summary sheet for update)  Subjective functional status/changes:   [] No changes reported       OBJECTIVE    Modality rationale:    Min Type Additional Details    [] Estim:  []Unatt       []IFC  []Premod                        []Other:  []w/ice   []w/heat  Position:  Location:    [] Estim: []Att    []TENS instruct  []NMES                    []Other:  []w/US   []w/ice   []w/heat  Position:  Location:    []  Traction: [] Cervical       []Lumbar                       [] Prone          []Supine                       []Intermittent   []Continuous Lbs:  [] before manual  [] after manual    []  Ultrasound: []Continuous   [] Pulsed                           []1MHz   []3MHz W/cm2:  Location:    []  Iontophoresis with dexamethasone         Location: [] Take home patch   [] In clinic    []  Ice     []  heat  []  Ice massage  []  Laser   []  Anodyne Position:  Location:    []  Laser with stim  []  Other:  Position:  Location:    []  Vasopneumatic Device    []  Right     []  Left  Pre-treatment girth:  Post-treatment girth:  Measured at (location):  Pressure:       [] lo [] med [] hi   Temperature: [] lo [] med [] hi   [] Skin assessment post-treatment:  []intact []redness- no adverse reaction    []redness - adverse reaction:     25 min [x]Eval                  []Re-Eval       15 min Therapeutic Exercise:  [] See flow sheet : HEP   Rationale: increase ROM and increase strength to improve the patients ability to perform ADL              With   [] TE   [] TA   [] neuro   [] other: Patient Education: [x] Review HEP    [] Progressed/Changed HEP based on:   [] positioning   [] body mechanics   [] transfers   [] heat/ice application    [] other:      Other Objective/Functional Measures:       Pain Level (0-10 scale) post treatment: 0    ASSESSMENT/Changes in Function:      Patient will continue to benefit from skilled PT services to modify and progress therapeutic interventions, address functional mobility deficits, address ROM deficits, address strength deficits, analyze and address soft tissue restrictions, analyze and cue movement patterns, analyze and modify body mechanics/ergonomics, and assess and modify postural abnormalities to attain remaining goals. [x]  See Plan of Care  []  See progress note/recertification  []  See Discharge Summary         Progress towards goals / Updated goals:  Short Term Goals: To be accomplished in 1 weeks:   1. The pt will be I and compliant with HEP    IE- issued HEP   Long Term Goals: To be accomplished in 4 weeks:   1. Improve FOTO score to the predicted outcome for improved ability for ADL   IE- 64   2. Improve left knee AROM flexion to 115 degress to improve  ability for functional tasks   IE- 110 degrees with pain   3. Improve B glute max MMT to 4-/5 to improve ability for functional activities. IE- 3-/5   4. Improve left quad MMT to 5/5 without pain to improve tolerance to daily activities. IE- 4+/5 with pain   5. The pt will report at least 50% improvement to improve tolerance to ADL   IE- 0    PLAN  []  Upgrade activities as tolerated     [x]  Continue plan of care  []  Update interventions per flow sheet       []  Discharge due to:_  []  Other:_      Yaima Blankenship, PT 2/10/2023  1:30 PM    No future appointments.

## 2023-12-11 PROBLEM — N84.0 POLYP OF CORPUS UTERI: Status: ACTIVE | Noted: 2023-12-11

## 2023-12-12 ENCOUNTER — OFFICE VISIT (OUTPATIENT)
Age: 48
End: 2023-12-12
Payer: COMMERCIAL

## 2023-12-12 VITALS
WEIGHT: 196 LBS | RESPIRATION RATE: 18 BRPM | DIASTOLIC BLOOD PRESSURE: 70 MMHG | OXYGEN SATURATION: 98 % | HEIGHT: 65 IN | BODY MASS INDEX: 32.65 KG/M2 | SYSTOLIC BLOOD PRESSURE: 124 MMHG | HEART RATE: 72 BPM | TEMPERATURE: 97.8 F

## 2023-12-12 DIAGNOSIS — E66.09 OBESITY DUE TO EXCESS CALORIES WITHOUT SERIOUS COMORBIDITY, UNSPECIFIED CLASSIFICATION: ICD-10-CM

## 2023-12-12 DIAGNOSIS — Z13.220 SCREENING CHOLESTEROL LEVEL: ICD-10-CM

## 2023-12-12 DIAGNOSIS — Z00.00 ENCOUNTER FOR WELL ADULT EXAM WITHOUT ABNORMAL FINDINGS: ICD-10-CM

## 2023-12-12 DIAGNOSIS — R20.0 NUMBNESS AND TINGLING IN BOTH HANDS: ICD-10-CM

## 2023-12-12 DIAGNOSIS — R20.2 NUMBNESS AND TINGLING IN BOTH HANDS: ICD-10-CM

## 2023-12-12 DIAGNOSIS — E55.9 VITAMIN D DEFICIENCY: ICD-10-CM

## 2023-12-12 DIAGNOSIS — Z00.00 ENCOUNTER FOR WELL ADULT EXAM WITHOUT ABNORMAL FINDINGS: Primary | ICD-10-CM

## 2023-12-12 PROCEDURE — 99396 PREV VISIT EST AGE 40-64: CPT | Performed by: FAMILY MEDICINE

## 2023-12-12 RX ORDER — SEMAGLUTIDE 0.25 MG/.5ML
0.25 INJECTION, SOLUTION SUBCUTANEOUS
Qty: 2 ML | Refills: 2 | Status: SHIPPED | OUTPATIENT
Start: 2023-12-12

## 2023-12-12 SDOH — ECONOMIC STABILITY: INCOME INSECURITY: HOW HARD IS IT FOR YOU TO PAY FOR THE VERY BASICS LIKE FOOD, HOUSING, MEDICAL CARE, AND HEATING?: NOT HARD AT ALL

## 2023-12-12 SDOH — ECONOMIC STABILITY: HOUSING INSECURITY
IN THE LAST 12 MONTHS, WAS THERE A TIME WHEN YOU DID NOT HAVE A STEADY PLACE TO SLEEP OR SLEPT IN A SHELTER (INCLUDING NOW)?: NO

## 2023-12-12 SDOH — ECONOMIC STABILITY: FOOD INSECURITY: WITHIN THE PAST 12 MONTHS, YOU WORRIED THAT YOUR FOOD WOULD RUN OUT BEFORE YOU GOT MONEY TO BUY MORE.: NEVER TRUE

## 2023-12-12 SDOH — ECONOMIC STABILITY: FOOD INSECURITY: WITHIN THE PAST 12 MONTHS, THE FOOD YOU BOUGHT JUST DIDN'T LAST AND YOU DIDN'T HAVE MONEY TO GET MORE.: NEVER TRUE

## 2023-12-12 ASSESSMENT — PATIENT HEALTH QUESTIONNAIRE - PHQ9
SUM OF ALL RESPONSES TO PHQ QUESTIONS 1-9: 0
SUM OF ALL RESPONSES TO PHQ QUESTIONS 1-9: 0
1. LITTLE INTEREST OR PLEASURE IN DOING THINGS: 0
6. FEELING BAD ABOUT YOURSELF - OR THAT YOU ARE A FAILURE OR HAVE LET YOURSELF OR YOUR FAMILY DOWN: 0
2. FEELING DOWN, DEPRESSED OR HOPELESS: 0
5. POOR APPETITE OR OVEREATING: 0
SUM OF ALL RESPONSES TO PHQ9 QUESTIONS 1 & 2: 0
SUM OF ALL RESPONSES TO PHQ QUESTIONS 1-9: 0
10. IF YOU CHECKED OFF ANY PROBLEMS, HOW DIFFICULT HAVE THESE PROBLEMS MADE IT FOR YOU TO DO YOUR WORK, TAKE CARE OF THINGS AT HOME, OR GET ALONG WITH OTHER PEOPLE: 0
7. TROUBLE CONCENTRATING ON THINGS, SUCH AS READING THE NEWSPAPER OR WATCHING TELEVISION: 0
4. FEELING TIRED OR HAVING LITTLE ENERGY: 0
SUM OF ALL RESPONSES TO PHQ QUESTIONS 1-9: 0
8. MOVING OR SPEAKING SO SLOWLY THAT OTHER PEOPLE COULD HAVE NOTICED. OR THE OPPOSITE, BEING SO FIGETY OR RESTLESS THAT YOU HAVE BEEN MOVING AROUND A LOT MORE THAN USUAL: 0
9. THOUGHTS THAT YOU WOULD BE BETTER OFF DEAD, OR OF HURTING YOURSELF: 0
3. TROUBLE FALLING OR STAYING ASLEEP: 0

## 2023-12-21 LAB
25(OH)D3+25(OH)D2 SERPL-MCNC: 32.5 NG/ML (ref 30–100)
ALBUMIN SERPL-MCNC: 4.3 G/DL (ref 3.9–4.9)
ALBUMIN/GLOB SERPL: 1.5 {RATIO} (ref 1.2–2.2)
ALP SERPL-CCNC: 80 IU/L (ref 44–121)
ALT SERPL-CCNC: 23 IU/L (ref 0–32)
AST SERPL-CCNC: 18 IU/L (ref 0–40)
BASOPHILS # BLD AUTO: 0 X10E3/UL (ref 0–0.2)
BASOPHILS NFR BLD AUTO: 1 %
BILIRUB SERPL-MCNC: 0.6 MG/DL (ref 0–1.2)
BUN SERPL-MCNC: 16 MG/DL (ref 6–24)
BUN/CREAT SERPL: 17 (ref 9–23)
CALCIUM SERPL-MCNC: 9.4 MG/DL (ref 8.7–10.2)
CHLORIDE SERPL-SCNC: 105 MMOL/L (ref 96–106)
CHOLEST SERPL-MCNC: 147 MG/DL (ref 100–199)
CO2 SERPL-SCNC: 25 MMOL/L (ref 20–29)
CREAT SERPL-MCNC: 0.95 MG/DL (ref 0.57–1)
EGFRCR SERPLBLD CKD-EPI 2021: 73 ML/MIN/1.73
EOSINOPHIL # BLD AUTO: 0 X10E3/UL (ref 0–0.4)
EOSINOPHIL NFR BLD AUTO: 1 %
ERYTHROCYTE [DISTWIDTH] IN BLOOD BY AUTOMATED COUNT: 12.6 % (ref 11.7–15.4)
GLOBULIN SER CALC-MCNC: 2.9 G/DL (ref 1.5–4.5)
GLUCOSE SERPL-MCNC: 94 MG/DL (ref 70–99)
HCT VFR BLD AUTO: 41.3 % (ref 34–46.6)
HDLC SERPL-MCNC: 40 MG/DL
HGB BLD-MCNC: 13.3 G/DL (ref 11.1–15.9)
IMM GRANULOCYTES # BLD AUTO: 0 X10E3/UL (ref 0–0.1)
IMM GRANULOCYTES NFR BLD AUTO: 0 %
LDLC SERPL CALC-MCNC: 95 MG/DL (ref 0–99)
LYMPHOCYTES # BLD AUTO: 1.8 X10E3/UL (ref 0.7–3.1)
LYMPHOCYTES NFR BLD AUTO: 46 %
MCH RBC QN AUTO: 27.8 PG (ref 26.6–33)
MCHC RBC AUTO-ENTMCNC: 32.2 G/DL (ref 31.5–35.7)
MCV RBC AUTO: 86 FL (ref 79–97)
MONOCYTES # BLD AUTO: 0.3 X10E3/UL (ref 0.1–0.9)
MONOCYTES NFR BLD AUTO: 9 %
NEUTROPHILS # BLD AUTO: 1.7 X10E3/UL (ref 1.4–7)
NEUTROPHILS NFR BLD AUTO: 43 %
PLATELET # BLD AUTO: 404 X10E3/UL (ref 150–450)
POTASSIUM SERPL-SCNC: 4 MMOL/L (ref 3.5–5.2)
PROT SERPL-MCNC: 7.2 G/DL (ref 6–8.5)
RBC # BLD AUTO: 4.79 X10E6/UL (ref 3.77–5.28)
SODIUM SERPL-SCNC: 139 MMOL/L (ref 134–144)
TRIGL SERPL-MCNC: 57 MG/DL (ref 0–149)
TSH SERPL DL<=0.005 MIU/L-ACNC: 1.22 UIU/ML (ref 0.45–4.5)
VLDLC SERPL CALC-MCNC: 12 MG/DL (ref 5–40)
WBC # BLD AUTO: 3.9 X10E3/UL (ref 3.4–10.8)

## 2024-04-23 ENCOUNTER — OFFICE VISIT (OUTPATIENT)
Age: 49
End: 2024-04-23
Payer: COMMERCIAL

## 2024-04-23 VITALS — WEIGHT: 183 LBS | BODY MASS INDEX: 30.45 KG/M2

## 2024-04-23 DIAGNOSIS — M22.2X1 PATELLOFEMORAL DISORDER, RIGHT: ICD-10-CM

## 2024-04-23 DIAGNOSIS — M25.811 SHOULDER IMPINGEMENT, RIGHT: Primary | ICD-10-CM

## 2024-04-23 DIAGNOSIS — M72.2 PLANTAR FASCIITIS, LEFT: ICD-10-CM

## 2024-04-23 DIAGNOSIS — M99.04 SACRAL REGION SOMATIC DYSFUNCTION: ICD-10-CM

## 2024-04-23 DIAGNOSIS — M99.08 RIB CAGE REGION SOMATIC DYSFUNCTION: ICD-10-CM

## 2024-04-23 DIAGNOSIS — M99.03 SOMATIC DYSFUNCTION OF LUMBAR REGION: ICD-10-CM

## 2024-04-23 DIAGNOSIS — M99.09 SOMATIC DYSFUNCTION OF ABDOMINAL REGION: ICD-10-CM

## 2024-04-23 DIAGNOSIS — M99.01 CERVICAL SOMATIC DYSFUNCTION: ICD-10-CM

## 2024-04-23 DIAGNOSIS — M99.02 THORACIC REGION SOMATIC DYSFUNCTION: ICD-10-CM

## 2024-04-23 DIAGNOSIS — M99.05 PELVIC REGION SOMATIC DYSFUNCTION: ICD-10-CM

## 2024-04-23 DIAGNOSIS — M99.06 LOWER LIMB REGION SOMATIC DYSFUNCTION: ICD-10-CM

## 2024-04-23 DIAGNOSIS — M99.07 UPPER EXTREMITY SOMATIC DYSFUNCTION: ICD-10-CM

## 2024-04-23 PROCEDURE — 98929 OSTEOPATH MANJ 9-10 REGIONS: CPT | Performed by: FAMILY MEDICINE

## 2024-04-23 PROCEDURE — 99214 OFFICE O/P EST MOD 30 MIN: CPT | Performed by: FAMILY MEDICINE

## 2024-04-23 NOTE — PROGRESS NOTES
Thoracic diaphragm restricted left. Scapula motion restricted w/ TTA left. Hip flexion limited right.    Assessment/Plan:    Diagnosis Orders   1. Shoulder impingement, right  diclofenac (VOLTAREN) 50 MG EC tablet      2. Plantar fasciitis, left  diclofenac (VOLTAREN) 50 MG EC tablet      3. Patellofemoral disorder, right  diclofenac (VOLTAREN) 50 MG EC tablet      4. Somatic dysfunction of lumbar region  OR OSTEOPATHIC MANIPULATIVE TX 9-10 BODY REGIONS      5. Sacral region somatic dysfunction  OR OSTEOPATHIC MANIPULATIVE TX 9-10 BODY REGIONS      6. Pelvic region somatic dysfunction  OR OSTEOPATHIC MANIPULATIVE TX 9-10 BODY REGIONS      7. Thoracic region somatic dysfunction  OR OSTEOPATHIC MANIPULATIVE TX 9-10 BODY REGIONS      8. Rib cage region somatic dysfunction  OR OSTEOPATHIC MANIPULATIVE TX 9-10 BODY REGIONS      9. Cervical somatic dysfunction  OR OSTEOPATHIC MANIPULATIVE TX 9-10 BODY REGIONS      10. Lower limb region somatic dysfunction  OR OSTEOPATHIC MANIPULATIVE TX 9-10 BODY REGIONS      11. Upper extremity somatic dysfunction  OR OSTEOPATHIC MANIPULATIVE TX 9-10 BODY REGIONS      12. Somatic dysfunction of abdominal region  OR OSTEOPATHIC MANIPULATIVE TX 9-10 BODY REGIONS          Patient (or guardian if minor) verbalizes understanding of evaluation and plan.    Verbal consent obtained.  Cervical, Thoracic, Rib, Lumbar, Pelvic, Sacral, Upper Ext, Lower Ext, and Abdominal SD treated with Myofascial and ME.  Correction of previous malalignments verified after Tx.    Pt tolerated well.  Notes improvement of Sx and pain is now rated 0/10.    HEP/stretches daily. Discussed stretching/strengthening/posture.    Will start HEP and Rx for Voltaren 50mg as above and plan follow-up 4 weeks. Consider XR and PT referral not improved.

## 2024-04-23 NOTE — PATIENT INSTRUCTIONS
Either scan this QR code on your smartphone:        Or search YouTube for my channel:    Dr. JESSICA Murray    Rotator cuff  Runner's Knee  Plantar Fasciitis    Try ankle weight (start 1 lb.) at ankle of injured knee and extend knee as far as possible and hold 1 second. Work up to 3 sets of 15 reps 4+ times/week.

## 2024-12-04 ENCOUNTER — PATIENT MESSAGE (OUTPATIENT)
Facility: CLINIC | Age: 49
End: 2024-12-04

## 2024-12-04 DIAGNOSIS — E55.9 VITAMIN D DEFICIENCY: ICD-10-CM

## 2024-12-04 DIAGNOSIS — Z13.29 SCREENING FOR THYROID DISORDER: ICD-10-CM

## 2024-12-04 DIAGNOSIS — Z13.220 SCREENING CHOLESTEROL LEVEL: ICD-10-CM

## 2024-12-04 DIAGNOSIS — Z00.00 ENCOUNTER FOR WELL ADULT EXAM WITHOUT ABNORMAL FINDINGS: ICD-10-CM

## 2024-12-04 DIAGNOSIS — R93.89 ABNORMAL CXR: ICD-10-CM

## 2024-12-04 DIAGNOSIS — Z00.00 ENCOUNTER FOR WELL ADULT EXAM WITHOUT ABNORMAL FINDINGS: Primary | ICD-10-CM

## 2024-12-06 ENCOUNTER — HOSPITAL ENCOUNTER (OUTPATIENT)
Facility: HOSPITAL | Age: 49
Discharge: HOME | End: 2024-12-09
Payer: COMMERCIAL

## 2024-12-06 DIAGNOSIS — R93.89 ABNORMAL CXR: ICD-10-CM

## 2024-12-06 LAB
25(OH)D3 SERPL-MCNC: 48.3 NG/ML (ref 30–100)
ALBUMIN SERPL-MCNC: 3.9 G/DL (ref 3.4–5)
ALBUMIN/GLOB SERPL: 0.9 (ref 0.8–1.7)
ALP SERPL-CCNC: 93 U/L (ref 45–117)
ALT SERPL-CCNC: 26 U/L (ref 13–56)
ANION GAP SERPL CALC-SCNC: 4 MMOL/L (ref 3–18)
AST SERPL-CCNC: 17 U/L (ref 10–38)
BASOPHILS # BLD: 0.1 K/UL (ref 0–0.1)
BASOPHILS NFR BLD: 1 % (ref 0–2)
BILIRUB SERPL-MCNC: 0.8 MG/DL (ref 0.2–1)
BUN SERPL-MCNC: 18 MG/DL (ref 7–18)
BUN/CREAT SERPL: 17 (ref 12–20)
CALCIUM SERPL-MCNC: 10.1 MG/DL (ref 8.5–10.1)
CHLORIDE SERPL-SCNC: 107 MMOL/L (ref 100–111)
CHOLEST SERPL-MCNC: 202 MG/DL
CO2 SERPL-SCNC: 28 MMOL/L (ref 21–32)
CREAT SERPL-MCNC: 1.05 MG/DL (ref 0.6–1.3)
DIFFERENTIAL METHOD BLD: ABNORMAL
EOSINOPHIL # BLD: 0.1 K/UL (ref 0–0.4)
EOSINOPHIL NFR BLD: 2 % (ref 0–5)
ERYTHROCYTE [DISTWIDTH] IN BLOOD BY AUTOMATED COUNT: 14 % (ref 11.6–14.5)
GLOBULIN SER CALC-MCNC: 4.2 G/DL (ref 2–4)
GLUCOSE SERPL-MCNC: 95 MG/DL (ref 74–99)
HCT VFR BLD AUTO: 43.5 % (ref 35–45)
HDLC SERPL-MCNC: 60 MG/DL (ref 40–60)
HDLC SERPL: 3.4 (ref 0–5)
HGB BLD-MCNC: 13.8 G/DL (ref 12–16)
IMM GRANULOCYTES # BLD AUTO: 0 K/UL (ref 0–0.04)
IMM GRANULOCYTES NFR BLD AUTO: 0 % (ref 0–0.5)
LDLC SERPL CALC-MCNC: 133.2 MG/DL (ref 0–100)
LIPID PANEL: ABNORMAL
LYMPHOCYTES # BLD: 1.5 K/UL (ref 0.9–3.6)
LYMPHOCYTES NFR BLD: 41 % (ref 21–52)
MCH RBC QN AUTO: 28.3 PG (ref 24–34)
MCHC RBC AUTO-ENTMCNC: 31.7 G/DL (ref 31–37)
MCV RBC AUTO: 89.3 FL (ref 78–100)
MONOCYTES # BLD: 0.5 K/UL (ref 0.05–1.2)
MONOCYTES NFR BLD: 12 % (ref 3–10)
NEUTS SEG # BLD: 1.6 K/UL (ref 1.8–8)
NEUTS SEG NFR BLD: 43 % (ref 40–73)
NRBC # BLD: 0 K/UL (ref 0–0.01)
NRBC BLD-RTO: 0 PER 100 WBC
PLATELET # BLD AUTO: 360 K/UL (ref 135–420)
PMV BLD AUTO: 10.4 FL (ref 9.2–11.8)
POTASSIUM SERPL-SCNC: 4.4 MMOL/L (ref 3.5–5.5)
PROT SERPL-MCNC: 8.1 G/DL (ref 6.4–8.2)
RBC # BLD AUTO: 4.87 M/UL (ref 4.2–5.3)
SODIUM SERPL-SCNC: 139 MMOL/L (ref 136–145)
TRIGL SERPL-MCNC: 44 MG/DL
TSH SERPL DL<=0.05 MIU/L-ACNC: 0.91 UIU/ML (ref 0.36–3.74)
VLDLC SERPL CALC-MCNC: 8.8 MG/DL
WBC # BLD AUTO: 3.6 K/UL (ref 4.6–13.2)

## 2024-12-06 PROCEDURE — 80053 COMPREHEN METABOLIC PANEL: CPT

## 2024-12-06 PROCEDURE — 82306 VITAMIN D 25 HYDROXY: CPT

## 2024-12-06 PROCEDURE — 80061 LIPID PANEL: CPT

## 2024-12-06 PROCEDURE — 85025 COMPLETE CBC W/AUTO DIFF WBC: CPT

## 2024-12-06 PROCEDURE — 84443 ASSAY THYROID STIM HORMONE: CPT

## 2024-12-06 PROCEDURE — 71046 X-RAY EXAM CHEST 2 VIEWS: CPT

## 2024-12-06 PROCEDURE — 36415 COLL VENOUS BLD VENIPUNCTURE: CPT

## 2024-12-09 ENCOUNTER — HOSPITAL ENCOUNTER (OUTPATIENT)
Facility: HOSPITAL | Age: 49
Discharge: HOME | End: 2024-12-12
Payer: COMMERCIAL

## 2024-12-09 VITALS — BODY MASS INDEX: 28.68 KG/M2 | HEIGHT: 64 IN | WEIGHT: 168 LBS

## 2024-12-09 DIAGNOSIS — Z12.31 ENCOUNTER FOR SCREENING MAMMOGRAM FOR MALIGNANT NEOPLASM OF BREAST: ICD-10-CM

## 2024-12-09 PROCEDURE — 77063 BREAST TOMOSYNTHESIS BI: CPT

## 2024-12-16 ENCOUNTER — OFFICE VISIT (OUTPATIENT)
Facility: CLINIC | Age: 49
End: 2024-12-16
Payer: COMMERCIAL

## 2024-12-16 VITALS
RESPIRATION RATE: 18 BRPM | OXYGEN SATURATION: 98 % | WEIGHT: 174 LBS | BODY MASS INDEX: 29.71 KG/M2 | HEIGHT: 64 IN | TEMPERATURE: 98.7 F | SYSTOLIC BLOOD PRESSURE: 124 MMHG | HEART RATE: 78 BPM | DIASTOLIC BLOOD PRESSURE: 74 MMHG

## 2024-12-16 DIAGNOSIS — Z00.00 ENCOUNTER FOR WELL ADULT EXAM WITHOUT ABNORMAL FINDINGS: Primary | ICD-10-CM

## 2024-12-16 DIAGNOSIS — E55.9 VITAMIN D DEFICIENCY: ICD-10-CM

## 2024-12-16 DIAGNOSIS — J18.9 PNEUMONIA OF LEFT LOWER LOBE DUE TO INFECTIOUS ORGANISM: ICD-10-CM

## 2024-12-16 DIAGNOSIS — Z23 NEED FOR IMMUNIZATION AGAINST INFLUENZA: ICD-10-CM

## 2024-12-16 PROCEDURE — 90661 CCIIV3 VAC ABX FR 0.5 ML IM: CPT | Performed by: FAMILY MEDICINE

## 2024-12-16 PROCEDURE — 90471 IMMUNIZATION ADMIN: CPT | Performed by: FAMILY MEDICINE

## 2024-12-16 PROCEDURE — 99396 PREV VISIT EST AGE 40-64: CPT | Performed by: FAMILY MEDICINE

## 2024-12-16 SDOH — ECONOMIC STABILITY: FOOD INSECURITY: WITHIN THE PAST 12 MONTHS, THE FOOD YOU BOUGHT JUST DIDN'T LAST AND YOU DIDN'T HAVE MONEY TO GET MORE.: PATIENT DECLINED

## 2024-12-16 SDOH — ECONOMIC STABILITY: INCOME INSECURITY: HOW HARD IS IT FOR YOU TO PAY FOR THE VERY BASICS LIKE FOOD, HOUSING, MEDICAL CARE, AND HEATING?: PATIENT DECLINED

## 2024-12-16 SDOH — ECONOMIC STABILITY: FOOD INSECURITY: WITHIN THE PAST 12 MONTHS, YOU WORRIED THAT YOUR FOOD WOULD RUN OUT BEFORE YOU GOT MONEY TO BUY MORE.: PATIENT DECLINED

## 2024-12-16 ASSESSMENT — PATIENT HEALTH QUESTIONNAIRE - PHQ9
SUM OF ALL RESPONSES TO PHQ QUESTIONS 1-9: 0
SUM OF ALL RESPONSES TO PHQ9 QUESTIONS 1 & 2: 0
SUM OF ALL RESPONSES TO PHQ QUESTIONS 1-9: 0
SUM OF ALL RESPONSES TO PHQ QUESTIONS 1-9: 0
1. LITTLE INTEREST OR PLEASURE IN DOING THINGS: NOT AT ALL
SUM OF ALL RESPONSES TO PHQ QUESTIONS 1-9: 0
2. FEELING DOWN, DEPRESSED OR HOPELESS: NOT AT ALL

## 2024-12-16 NOTE — PROGRESS NOTES
Chief Complaint   Patient presents with    Annual Exam    Results     Review of results         Well Adult Note  Name: Deborah Jhaveri Today’s Date: 2024   MRN: [id number] Sex: Female   Age: 49 y.o. Ethnicity: Non- / Non    : 1975 Race: Black /       Deborah Jhaveri is here for well adult exam.  History:    Has had succuess losing weight with diet and exercise.    Had LLL pneumonia a few months back.  That has resolved.     Taking vit D and several other supplements.    Review of Systems   All other systems reviewed and are negative.      Allergies   Allergen Reactions    Milk (Cow) Diarrhea         Prior to Visit Medications    Medication Sig Taking? Authorizing Provider   norethindrone-ethinyl estradiol-Fe (LO LOESTRIN FE) 1 MG-10 MCG / 10 MCG tablet ceived the following from [facility]: Outside name: Lo Loestrin Fe 1 mg-10 mcg (24)/10 mcg (2) tab Yes Automatic Reconciliation, Ar   diclofenac (VOLTAREN) 50 MG EC tablet Take 1 tablet by mouth with breakfast and with evening meal As needed pain.  Larry Murray, DO         Past Medical History:   Diagnosis Date    Allergic rhinitis     H/O colonoscopy with polypectomy 2023    Hallux valgus     Patellofemoral syndrome     Vitamin D deficiency        Past Surgical History:   Procedure Laterality Date    OTHER SURGICAL HISTORY  3/2013    hysteroscopy - polyp removal         Family History   Problem Relation Age of Onset    Diabetes Mother     Asthma Mother     Hypertension Mother     Heart Disease Father 60 - 69        CABG, CAD     Prostate Cancer Father         prostate    Elevated Lipids Brother     Ovarian Cancer Maternal Grandmother     Dementia Paternal Grandmother        Social History     Tobacco Use    Smoking status: Never    Smokeless tobacco: Never   Vaping Use    Vaping status: Never Used   Substance Use Topics    Alcohol use: Yes     Comment: 1/month    Drug use: Never       Objective